# Patient Record
Sex: FEMALE | Race: BLACK OR AFRICAN AMERICAN | NOT HISPANIC OR LATINO | Employment: UNEMPLOYED | URBAN - METROPOLITAN AREA
[De-identification: names, ages, dates, MRNs, and addresses within clinical notes are randomized per-mention and may not be internally consistent; named-entity substitution may affect disease eponyms.]

---

## 2024-07-18 ENCOUNTER — TELEPHONE (OUTPATIENT)
Dept: MATERNAL FETAL MEDICINE | Facility: CLINIC | Age: 34
End: 2024-07-18
Payer: MEDICAID

## 2024-07-18 DIAGNOSIS — O09.292 HISTORY OF GESTATIONAL DIABETES IN PRIOR PREGNANCY, CURRENTLY PREGNANT IN SECOND TRIMESTER: ICD-10-CM

## 2024-07-18 DIAGNOSIS — O09.522 AMA (ADVANCED MATERNAL AGE) MULTIGRAVIDA 35+, SECOND TRIMESTER: Primary | ICD-10-CM

## 2024-07-18 DIAGNOSIS — Z86.32 HISTORY OF GESTATIONAL DIABETES IN PRIOR PREGNANCY, CURRENTLY PREGNANT IN SECOND TRIMESTER: ICD-10-CM

## 2024-07-18 DIAGNOSIS — O09.892 HISTORY OF PRETERM DELIVERY, CURRENTLY PREGNANT IN SECOND TRIMESTER: ICD-10-CM

## 2024-07-18 NOTE — TELEPHONE ENCOUNTER
Called pt twice today to schedule appt. A small child answers the phone and verbalizes she is at work. Unable to schedule appt with patient until she is reached.

## 2024-07-19 ENCOUNTER — TELEPHONE (OUTPATIENT)
Dept: MATERNAL FETAL MEDICINE | Facility: CLINIC | Age: 34
End: 2024-07-19
Payer: MEDICAID

## 2024-07-22 ENCOUNTER — TELEPHONE (OUTPATIENT)
Dept: MATERNAL FETAL MEDICINE | Facility: CLINIC | Age: 34
End: 2024-07-22
Payer: MEDICAID

## 2024-07-22 PROBLEM — O99.320 MARIJUANA USE DURING PREGNANCY: Status: ACTIVE | Noted: 2024-07-22

## 2024-07-22 PROBLEM — O09.299 HISTORY OF GESTATIONAL DIABETES MELLITUS (GDM) IN PRIOR PREGNANCY, CURRENTLY PREGNANT: Status: ACTIVE | Noted: 2024-07-22

## 2024-07-22 PROBLEM — F12.90 MARIJUANA USE DURING PREGNANCY: Status: ACTIVE | Noted: 2024-07-22

## 2024-07-22 PROBLEM — Z86.32 HISTORY OF GESTATIONAL DIABETES MELLITUS (GDM) IN PRIOR PREGNANCY, CURRENTLY PREGNANT: Status: ACTIVE | Noted: 2024-07-22

## 2024-07-22 PROBLEM — O09.292 HISTORY OF PRETERM PREMATURE RUPTURE OF MEMBRANES (PROM) IN PREVIOUS PREGNANCY, CURRENTLY PREGNANT IN SECOND TRIMESTER: Status: ACTIVE | Noted: 2024-07-22

## 2024-07-22 PROBLEM — O09.90 AT HIGH RISK FOR COMPLICATIONS OF INTRAUTERINE PREGNANCY (IUP): Status: ACTIVE | Noted: 2024-07-22

## 2024-07-22 PROBLEM — O09.522 MULTIGRAVIDA OF ADVANCED MATERNAL AGE IN SECOND TRIMESTER: Status: ACTIVE | Noted: 2024-07-22

## 2024-07-22 PROBLEM — O09.299 HX OF PREECLAMPSIA, PRIOR PREGNANCY, CURRENTLY PREGNANT: Status: ACTIVE | Noted: 2024-07-22

## 2024-07-22 PROBLEM — E66.3 OVERWEIGHT (BMI 25.0-29.9): Status: ACTIVE | Noted: 2024-07-22

## 2024-07-22 PROBLEM — Z14.8 CARRIER OF SPINAL MUSCULAR ATROPHY: Status: ACTIVE | Noted: 2024-07-22

## 2024-07-22 NOTE — PROGRESS NOTES
Maternal Fetal Medicine New Consult    Subjective:     Patient ID: [id number]    Chief Complaint: MFM Consult w/us  (For AMA, h/o PTD x2, h/o GHTN and GDM, + UDS/)      HPI: 34 y.o.  female  at 14w1d gestation with Estimated Date of Delivery:  2025. by LMP, consistent with early US. She is sent for MFM consultation for AMA, h/o PTD x's 2, h/o GHTN and GDM in prior pregnancies, pos UDS .     She is of advanced maternal age and will be 34 by the DELFINO.  She reports that she did cell free DNA 2 days ago.  She has history of 2 previous  deliveries.  Her 1st  delivery was at 35 weeks due to PPROM.  Her 2nd  delivery was at 32 weeks due to  labor.  That pregnancy was also complicated by gestational hypertension and GDM. She was on Dania for her last pregnancy and delivered at term. She has vague history of prediabetes vs type 2 Diabetes diagnosed in 2018 that she now reports only having gestational diabetes. She has CHTN, diagnosed in her last / pregnancy. She had carrier screening that returned positive for SMA carrier.  She reports that the father of the baby has the order to do the lab work to check for this.  She had UDS on 24 that was positive for cannabinoids. She denies any drug use.  Her BMI is  28.43 at initial MFM visit. She has known fibroid uterus. She is on low dose aspirin once daily.       She denies any personal or family history of aneuploidy or anomalies. She denies any exposure to high fevers, viral rashes, or alcohol in this pregnancy.  She denies any leaking fluid, vaginal bleeding, contractions, decreased fetal movement. Denies headaches, visual disturbances, or epigastric pain.       Pregnancy complications include:  Patient Active Problem List   Diagnosis    Multigravida of advanced maternal age in second trimester    (BMI 25.0-29.9)    At high risk for complications of intrauterine pregnancy (IUP)    Marijuana use during pregnancy    History of  " premature rupture of membranes (PROM) in previous pregnancy, currently pregnant in second trimester    Hx of preeclampsia, prior pregnancy, currently pregnant    History of gestational diabetes mellitus (GDM) in prior pregnancy, currently pregnant    Carrier of spinal muscular atrophy    Pre-existing essential hypertension complicating pregnancy in second trimester    Type 2 diabetes mellitus affecting pregnancy in second trimester, antepartum       Past Medical History:   Diagnosis Date    Gestational diabetes     ,     Gestational hypertension     2022       History reviewed. No pertinent surgical history.    Family History   Problem Relation Name Age of Onset    Hypertension Maternal Grandmother      Diabetes Maternal Grandmother      Heart attack Maternal Grandmother      Diabetes Mother         Social History     Socioeconomic History    Marital status: Single   Tobacco Use    Smoking status: Never     Passive exposure: Current    Smokeless tobacco: Never   Substance and Sexual Activity    Alcohol use: Not Currently    Drug use: Never    Sexual activity: Yes     Partners: Male       Current Outpatient Medications   Medication Sig Dispense Refill    aspirin (ECOTRIN) 81 MG EC tablet Take 81 mg by mouth.      prenatal vit37-iron-folic acid 29 mg iron- 1 mg Chew Take by mouth.      ondansetron (ZOFRAN-ODT) 4 MG TbDL Take 4 mg by mouth. (Patient not taking: Reported on 2024)       No current facility-administered medications for this visit.       Review of patient's allergies indicates:  No Known Allergies     Review of Systems   12 point review of systems conducted, negative except as stated in the history of present illness. See HPI for details.      Objective:     Visit Vitals  /71 (BP Location: Left arm, Patient Position: Sitting, BP Method: Medium (Automatic))   Pulse 87   Ht 5' 8" (1.727 m)   Wt 84.8 kg (187 lb)   LMP  (LMP Unknown)   BMI 28.43 kg/m²        Physical " Exam  Vitals and nursing note reviewed.   Constitutional:       General: She is not in acute distress.     Appearance: Normal appearance.   HENT:      Head: Normocephalic and atraumatic.      Nose: Nose normal. No congestion.      Mouth/Throat:      Pharynx: Oropharynx is clear.   Eyes:      General: No scleral icterus.     Conjunctiva/sclera: Conjunctivae normal.   Cardiovascular:      Rate and Rhythm: Normal rate and regular rhythm.   Pulmonary:      Effort: No respiratory distress.      Breath sounds: Normal breath sounds. No wheezing.   Abdominal:      General: Abdomen is flat.      Palpations: Abdomen is soft.      Tenderness: There is no abdominal tenderness. There is no right CVA tenderness, left CVA tenderness or guarding.      Comments: No CVA tenderness gravid uterus.    Musculoskeletal:         General: Normal range of motion.      Cervical back: Neck supple.      Right lower leg: No edema.      Left lower leg: No edema.   Skin:     General: Skin is warm.      Findings: No bruising or rash.   Neurological:      General: No focal deficit present.      Mental Status: She is oriented to person, place, and time.      Deep Tendon Reflexes: Reflexes normal.      Comments: Normal reflexes   Psychiatric:         Mood and Affect: Mood normal.         Behavior: Behavior normal.         Thought Content: Thought content normal.         Judgment: Judgment normal.         Assessment/Plan:     34 y.o.  female with IUP at 14w1d    Advanced maternal age at 34  Viable IUP with size c/w dates on 24    I discussed with her that the higher risk of aneuploidy related to advanced maternal age is caused by meiotic nondysjunction.  At this maternal  age, the risk of Down syndrome quoted at 1:267 and the risk of any chromosomal problems quoted at 1:148. She would not consider termination of pregnancy even if anomalies or aneuploidy is detected. She was advised of the screening nature of the Level 2 ultrasound as well  as the screening nature of a quad screen to check for the risk of aneuploidy with normal ultrasound and or quad screen testing that would lower the background risk of aneuploidy. We discussed the screening options available for risk assessment for fetal aneuploidy during pregnancy.  We also discussed the available diagnostic tests (CVS and amniocentesis).    The patient received genetic counseling about diagnostic and screening options, including: Cell free DNA, First trimester risk-assessment with nuchal translucency and serum markers (between 11 and 14 weeks) with second trimester lab draw between 15-20 weeks, ideally prior to anatomic survey, Chorionic villus sampling (CVS), Amniocentesis, and Second trimester quad screening or integrated screening. Unlike screening tests, diagnostic tests provide definitive answers regarding the presence of fetal chromosome abnormalities. In addition to assessing for the presence of the common fetal aneuploidies, diagnostic tests can assess for abnormalities in the number and structure of all chromosomes. While both procedures are generally quite safe, there is a risk of miscarriage associated with these procedures. For CVS, the estimated risk of miscarriage attributable to the procedure is 1 in 500. For genetic amniocentesis, the estimated risk of miscarriage attributable to the procedure is 1 in 900.      She was counseled on Cell free DNA understanding that it is an enhanced screening test but not a diagnostic test. It assesses risk for common aneuploidies such as Trisomy 13, 18, and 21 by evaluating cell-free fetal DNA in maternal circulation with a false positive rate less than 0.5% for Trisomy 21 and less reliable for Trisomy 13 and Trisomy 18. She had cell free DNA that is pending.      She was advised that the only diagnostic test after 16 weeks is genetic amniocentesis.  Benefits and risks of a genetic amniocentesis were discussed. Patient was offered genetic  amniocentesis, after thorough counseling on benefits and risks of procedure, with very remote risk of complications and in some studies no identifiable increased risk, above background risk of spontaneous miscarriage, while some current data suggests risk up to 1 in 800 with early amniocentesis prior to 24 weeks, and remote risk of need for emergency delivery with all the complications of prematurity with amniocentesis after 24 weeks.     I also discussed with them that cell free DNA will not detect other genetic diseases or more subtle chromosomal abnormalities like microdeletions or duplications. The added benefit of doing chromosomal microarray analysis on amniotic fluid is that it would detect clinically relevant deletions or duplications in about 1:60 (1.7%) when the indication is abnormal quad screen or advanced maternal age, and 6.0 % when a structural anomaly is present. The concern about microarray analysis is that it could give uncertain findings in about 1.5-2.0% of cases that would increase anxiety and may require specialized genetic counseling.     In a recent study from 2018, clinically significant chromosomal microarray aberrations were seen in 4.7% of pregnancies with US anomalies (excluding soft markers for aneuploidy), but including patients with isolated FGR and polyhydramnios. The most common abnormality seen with cardiovascular defects was 22q11.21 deletion (DiGeorge-velocardiofacial syndrome), and the most common abnormality among genitourinary malformations was 17q12 deletion (encompassing HFNF1B). Aberrations were present in 13.1% if multiple anomalies, and around 4 % if single ultrasonographic anomaly.     She declined amniocentesis . She is aware of the need for  evaluation.    The higher risk of anomalies associated with advanced maternal age was also addressed. The reassurance obtained by the normal ultrasound and the limitations of ultrasound in checking for anomalies was  explained with the need for regular  evaluations.     I recommend targeted anatomical ultrasound at 18-20 weeks, and additional fetal surveillance as below.    Delivery timing as below.    Previous  delivery x2  I discussed with her the increased risk of recurrence of another  delivery with risk around 1/3. The risk ranges from 15% with one previous  delivery after 32 weeks that was followed by a term at birth to 60% with history of 2 consecutive  deliveries before 32 weeks. I have shared with her that the  delivery could occur at an earlier gestational age with more significant morbidity and high risk of  mortality associated with that.    Discussed withdrawal of 17P by FDA and new guidelines for management of previous  delivery. Recommend intermittent transvaginal ultrasounds starting at 16 weeks until 23 weeks. If cervical length is less than 3 cm, weekly TVUS is recommended and may consider vaginal progesterone nightly. If cervix is less than 25 mm, consider cerclage, especially if previous  delivery less 28 weeks, than along with continuing vaginal progesterone nightly. Conversely, may consider nightly vaginal progesterone nightly starting at 16 weeks until 37 weeks regardless of cervical length, although no data of benefit if cervix longer than 3 cm. Discussed risks/benefits of all options, understanding that neither option is fully protective against pregnancy loss. She would like to start vaginal progesterone nightly in addition to cervical surveillance.    TVUS today 2024  with normal closed cervix 3.0 cm without funneling at the IO. No evidence of cervical insufficiency at this time. Will recheck in about 2 weeks. PTL precautions given.    Will plan to do TVUS intermittently until 24 weeks. If cervical length less than 2.5 cm, consider cerclage along with vaginal progesterone. PTL precautions given.       Possible prediabetes vs T2DM  vs Hx GDM  Discussed increased risk of recurrence of GDM in subsequent pregnancies  With this vague history of prediabetes vs T2DM vs GDM, and weight loss since last pregnancy, we will plan to get HgbA1c to further evaluate. If elevated, recommend to start home accuchecks and manage accordingly. If normal, recommend early 1 hour GCT with a fasting glucose level at the usual time (around 24-28 weeks).   Advised patient of the importance of following a healthy diet and avoiding excessive weight gain during pregnancy.    Chronic hypertension  Chronic hypertension complicates up to 2-5% of pregnancies and is associated with significant adverse pregnancy outcomes including  birth, fetal growth restriction, fetal demise, placental abruption, and  delivery. Recent data suggest higher risk of certain congenital anomalies, including, heart defects, hypospadias and esophageal atresia. The incidence of adverse outcomes seen in pregnancies complicated by chronic hypertension is related to the degree and duration of hypertension and to the association of other organ system involvement or damage. Most pregnant women with mild chronic hypertension have uneventful pregnancies with no end-organ involvement. Comparing women who developed superimposed preeclampsia with women who did not, the incidence of  birth was 100% versus 38%, the incidence of small-for-gestational-age (SGA) infants was 78% versus 15%, and the  mortality rate was 48% versus 0%. Besides superimposed preeclampsia or eclampsia, pregnancy complicated by chronic hypertension (especially if severe) may be associated with worsening or malignant hypertension, central nervous system hemorrhage, cardiac decompensation, and renal deterioration or failure.    The age of onset, results of previous evaluation, severity and duration of hypertension, and physical examination are important determinants of which clinical tests may be useful. Ideally,  "a woman with chronic hypertension should be evaluated before pregnancy to ascertain potentially reversible causes and end-organ involvement (eg, heart or kidney). Baseline laboratory tests may include serum creatinine, blood urea nitrogen, electrolytes, CBC and 24-hour urine evaluation for total protein and creatinine clearance. Women who have had hypertension for several years are more likely to have cardiomegaly, ischemic heart disease, renal involvement, and retinopathy. In patients with severe disease over 4 years, or long standing hypertension (typically if over 30 years old), tests which may prove useful in the evaluation of these women include electrocardiography, echocardiography, ophthalmologic examination, and renal ultrasonography.     Women with paroxysmal hypertension, frequent "hypertensive crisis," seizure disorders, or anxiety attacks should be evaluated for pheochromocytoma with measurements of 24-hour urine vanillylmandelic acid, metanephrines, or unconjugated catecholamines. Magnetic resonance imaging after the first trimester or computed tomography may be useful for adrenal tumor localization. Renal artery stenosis appears to be more prevalent in patients with type-2 diabetes and coexistent hypertension. Doppler flow studies or magnetic resonance angiography are helpful to detect renal artery stenosis. Negative results from renal ultrasonography do not exclude renal artery stenosis.     In women with chronic hypertension, it can be difficult to discern worsening hypertension that might occur as a result of physiological changes of pregnancy in the third trimester from the development of preeclampsia. The use of certain laboratory tests such as serum creatinine, liver functions tests, hematocrit and platelets to compare to baseline values from early in pregnancy might serve to delineate these conditions. Routine screening of women with chronic hypertension with these laboratory tests is not " routinely indicated.    I have shared with her the normal natural course of chronic hypertension in pregnancy with improvement early on and likely increasing blood pressure as the pregnancy advances. Based on findings of recent CHAP Study, it is recommended to utilize 140/90 as the threshold for initiation or titration of medical therapy for chronic hypertension in pregnancy, rather than the previously recommended threshold of 160/110. For patients on blood pressure medications at the start of pregnancy, in the absence of mitigating factors or side effects, they can be maintained on their medications, rather than discontinuing them and waiting to initiate treatment for blood pressures in the severe range. Commonly used medications include nifedipine and labetalol.    BP Readings from Last 1 Encounters:   24 115/71   With this blood pressure, she was advised to follow low sodium diet with no medication at this time.    Use aspirin as discussed.    She was advised of the higher risk of fetal growth restriction and superimposed preeclampsia with her underlying chronic hypertension. The risk of placental abruption was also discussed. No prevention is available with her reporting any bleeding or cramping. She was also advised to report any headaches, visual problems, epigastric pain.    There is no consensus as to the most appropriate fetal surveillance test(s) or the interval and timing of testing in  with chronic hypertension. Thus, such testing should be individualized and based on clinical judgment and on severity of disease.    We will plan to do a level 2 scan around 20 weeks understanding the limitations of ultrasound in checking anomalies and follow-up ultrasounds to monitor growth around 24-26 weeks and then every 4 weeks until the end of pregnancy. Recommend fetal testing starting around 32 weeks gestation until the end of pregnancy    Among patients with uncomplicated chronic hypertension with no  additional risk factors, delivery at 38-39 weeks appears to provide optimal balance between the risk of adverse fetal and  outcomes. However, with multiple comorbidities, will reassess closer to EDC to determine optimal timeframe or GA for delivery, based on current evaluation at that time.      UDS positive for marijuana  I discussed with her the likely cognitive developmental effect of those drugs that would not be visible by the ultrasound and the importance of long term  follow up.  Marijuana use in pregnancy is associated with long term neurobehavioral adverse outcomes, as well as fetal growth restriction, stillbirth, spontaneous  birth, and  intensive care unit admission. .  I stressed the importance of complete abstinence from drug use in pregnancy. I would recommend doing intermittent drug screens to help with compliance, with risks and benefits of drug testing discussed and with ultrasounds to check fetal growth every 4 weeks (with continued drug use).         SMA carrier  Spinal muscular atrophy (SMA) is characterized by degeneration of the anterior horn cells in the spinal cord and motor nuclei in the lower brainstem, which results in progressive muscle weakness and atrophy. The incidence of spinal muscular atrophy ranges from 5 to 13 per 100,000 live births, and the carrier frequency of disease-causing SMN1 mutations ranges from 1:100 to 1:45, with marked interethnic variability.      Patients with all forms of SMA have diffuse symmetric proximal muscle weakness that is greater in the lower than upper limbs and absent or markedly decreased deep tendon reflexes. Infants with SMA type 1 have a severe symmetric flaccid paralysis and are unable to sit unsupported. All SMA types, particularly SMA type 1, may be associated with restrictive lung disease. Infants with type 1 SMA usually  before the 2nd birthday and rarely live beyond 5 years. Children with type 2 or 3 SMA live  full lives depending on the severity of symptoms.    The inheritance pattern of the common forms of SMA is autosomal recessive.  I discussed with her that if the father of the baby is affected, there is 25% risk of transmission to the baby. I discussed with her that if the FOB is negative, then that is reassuring but a very small risk remains for transmission to fetus, not zero, as the test does not  all mutations.    She was advised that the only diagnostic test at this time is genetic amniocentesis.  Benefits and risks of a genetic amniocentesis were discussed. Patient was offered genetic amniocentesis, after thorough counseling on benefits and risks of procedure, with very remote risk of complications and in some studies no identifiable increased risk, above background risk of spontaneous miscarriage, while some current data suggests risk up to 1 in 800 with early amniocentesis prior to 24 weeks, and remote risk of need for emergency delivery with all the complications of prematurity with amniocentesis after 24 weeks. She declined amniocentesis .     I discussed with her that the approximate frequency of carrier status in  [place] is 1/72.  Thus, if partner is , the approximate risk of the baby being affected with SMA without additional testing is 1/288..  She verbalized understanding and will have FOB tested, and he currently already has that order. She was made aware of the need for  evaluation.       BMI 28.4 at initial OB visit  Body mass index is 28.43 kg/m².    She was advised of the importance of eating healthy and and limiting weight gain to 15-25lbs during the pregnancy, as optimal in this situation. Discussed the importance of losing weight after this pregnancy, especially before attempting future pregnancy. Breastfeeding may be an important tool in reducing the postpartum weight retention. Excess weight gain would be associated with worsening hypertension,  diabetes and adverse  outcomes, including fetal demise in utero.       At risk for preeclampsia  With her risk factors for preeclampsia including preeclampsia/GHTN in a previous pregnancy and chronic hypertension and possible preexisting diabetes,  ancestry, low socioeconomic status, and maternal age 35 years or older, discussed recommendations for low dose aspirin use to decrease risks for adverse outcomes, including preeclampsia, low birth rate and  delivery. Low-dose aspirin reduced the risk for preeclampsia by 15% in clinical trials and reduced the risk for  birth by 20% and FGR by 20%, and  mortality by around 20%.  After discussing risks/benefits of its use, it was agreed to start asa 81 mg BID, due to multiple risk factors for preeclampsia. After discussing benefits (more effective than daily) vs potential risks (less data on safety with use at delivery), she agreed to start low dose aspirin twice daily and continue until 34 6/7weeks, then decrease to once daily until delivery. Also, recommend using in all future pregnancies.    Placenta previa  I discussed with her that with this finding, that 1 in 200 pregnancies are complicated by previa. I discussed with her that more than 90% of placentas in this situation will move upward as the pregnancy advances and would not pose problem during the pregnancy.  However, she was advised to be on pelvic rest till we see upward migration of the placenta and expectant management needs to be done.      I discussed with her if she has any intermittent bleeding to report that immediately and go hospital/office to be evaluated.  If placenta covering cervix persists, she will need a  section with higher risk of postpartum hemorrhage and the risk of hysterectomy at the time of the section.  However, this is unlikely with no previous uterine surgery at this stage in the pregnancy. Otherwise, at this time, expectant management needs  to be done.      Leiomyoma  Largest mean 1.9 cm on 24    I counseled the patient regarding fibroids in pregnancy. She was counseled on the risk for  delivery,  PROM, abnormal presentations, higher risk of  sections, placenta previa, fibroid degeneration, poor fetal growth, oligohydramnios, vaginal bleeding and postpartum hemorrhage and in rare circumstances obstruction of lower uterus/cervix, impeding vaginal delivery. Although it was previously suggested that myomas are associated with spontaneous miscarriages, a recent review from 2017, showed no increased risk of spontaneous miscarriages in an analysis of 20,000 pregnant women with fibroids.Recent data shows potential association with increase stillbirth risk. The size of myomas, number and position behind placenta may be important factors in that risk. There is no prevention available and expectant management needs to be done.     Myomectomy is generally avoided in pregnancy due to concerns regarding pregnancy complications.    Recommend fetal growth ultrasound at 32 weeks (if no other risk factors) for patients with a single large fibroid or multiple fibroids. If concern for degenerating fibroids, can administer short course of NSAID (48-72hrs of Indocin) prior to 32 weeks gestation.     She was instructed to report any increased pain, cramps, vaginal discharge or bleeding. Emphasized the importance of monitoring fetal kick count activity, and reporting immediately if decreased fetal movement occurs.    Document postpartum hemorrhage risk on admission to hospital, ensure T&S sent, and consider type and crossmatch depending on postpartum hemorrhage risk    History of past history of diabetes but not recently.  Her hemoglobin A1c was ordered.  Random sugar in the office is normal.  She weighs less than now than it was before and likely improvement in the sugar.  Multiple fibroids seen.  Patient quit using marijuana.  She will stay on a  low-salt diet.  We will order 24 hour urine.  Reviewed records from Dr. Bruce.  Partner to be tested for SMA carrier status.  Diet advice with proper weight gain recommendations reviewed.  We will plan to do cervical surveillance.  With cervical length at 3 cm started vaginal progesterone and will rechecked again in 2 weeks.  Anterior complete placenta previa.  Pelvic rest and expect movement of the placenta upward as the pregnancy advances. Patient's questions were answered.  She is in agreement with plan of care.        Follow up in about 2 weeks (around 8/6/2024) for MFM follow-up, Transvaginal ultrasound.     No future appointments.       Patient was evaluated by Nereida Mcgee, BRIAN, and and Dr. Wilson.  Final assessment and recommendations as stated above were made by Dr. Wilson.    This note was created with the assistance of [employer] voice recognition software. There may be transcription errors as a result of using this technology, however minimal. Effort has been made to ensure accuracy of transcription, but any obvious errors or omissions should be clarified with the author of the document.

## 2024-07-22 NOTE — TELEPHONE ENCOUNTER
Attempted to contact pt 3x's to confirm her scheduled consultation. Had to [facility] for pt.

## 2024-07-22 NOTE — TELEPHONE ENCOUNTER
----- Message from Kristal Buck sent at 7/22/2024  1:14 PM CDT -----  Regarding: FW: cfDNA    ----- Message -----  From: Shazia Norman PA-C  Sent: 7/22/2024  12:39 PM CDT  To: [facility]   Subject: cfDNA                                            Please call Dr. Burden office and see if they did a cfDNA    DR. BURDEN'S OFFICE SAID NO THEY DO NOT HAVE ONE ON FILE.

## 2024-07-23 ENCOUNTER — OFFICE VISIT (OUTPATIENT)
Dept: MATERNAL FETAL MEDICINE | Facility: CLINIC | Age: 34
End: 2024-07-23
Payer: MEDICAID

## 2024-07-23 ENCOUNTER — PROCEDURE VISIT (OUTPATIENT)
Dept: MATERNAL FETAL MEDICINE | Facility: CLINIC | Age: 34
End: 2024-07-23
Payer: MEDICAID

## 2024-07-23 VITALS
BODY MASS INDEX: 28.34 KG/M2 | DIASTOLIC BLOOD PRESSURE: 71 MMHG | HEART RATE: 87 BPM | HEIGHT: 68 IN | SYSTOLIC BLOOD PRESSURE: 115 MMHG | WEIGHT: 187 LBS

## 2024-07-23 DIAGNOSIS — O34.10 LEIOMYOMA IN PREGNANCY, UTERINE, ANTEPARTUM: ICD-10-CM

## 2024-07-23 DIAGNOSIS — O99.320 MARIJUANA USE DURING PREGNANCY: ICD-10-CM

## 2024-07-23 DIAGNOSIS — E66.3 OVERWEIGHT (BMI 25.0-29.9): ICD-10-CM

## 2024-07-23 DIAGNOSIS — O44.02 PLACENTA PREVIA ANTEPARTUM IN SECOND TRIMESTER: ICD-10-CM

## 2024-07-23 DIAGNOSIS — O09.522 AMA (ADVANCED MATERNAL AGE) MULTIGRAVIDA 35+, SECOND TRIMESTER: ICD-10-CM

## 2024-07-23 DIAGNOSIS — O09.292 HISTORY OF PRETERM PREMATURE RUPTURE OF MEMBRANES (PROM) IN PREVIOUS PREGNANCY, CURRENTLY PREGNANT IN SECOND TRIMESTER: ICD-10-CM

## 2024-07-23 DIAGNOSIS — O09.892 HISTORY OF PRETERM DELIVERY, CURRENTLY PREGNANT IN SECOND TRIMESTER: ICD-10-CM

## 2024-07-23 DIAGNOSIS — Z86.32 HISTORY OF GESTATIONAL DIABETES MELLITUS (GDM) IN PRIOR PREGNANCY, CURRENTLY PREGNANT: ICD-10-CM

## 2024-07-23 DIAGNOSIS — F12.90 MARIJUANA USE DURING PREGNANCY: ICD-10-CM

## 2024-07-23 DIAGNOSIS — O09.299 HISTORY OF GESTATIONAL DIABETES MELLITUS (GDM) IN PRIOR PREGNANCY, CURRENTLY PREGNANT: ICD-10-CM

## 2024-07-23 DIAGNOSIS — Z86.32 HISTORY OF GESTATIONAL DIABETES IN PRIOR PREGNANCY, CURRENTLY PREGNANT IN SECOND TRIMESTER: ICD-10-CM

## 2024-07-23 DIAGNOSIS — Z14.8 CARRIER OF SPINAL MUSCULAR ATROPHY: ICD-10-CM

## 2024-07-23 DIAGNOSIS — D25.9 LEIOMYOMA IN PREGNANCY, UTERINE, ANTEPARTUM: ICD-10-CM

## 2024-07-23 DIAGNOSIS — O09.292 HISTORY OF GESTATIONAL DIABETES IN PRIOR PREGNANCY, CURRENTLY PREGNANT IN SECOND TRIMESTER: ICD-10-CM

## 2024-07-23 DIAGNOSIS — O10.012 PRE-EXISTING ESSENTIAL HYPERTENSION COMPLICATING PREGNANCY IN SECOND TRIMESTER: ICD-10-CM

## 2024-07-23 DIAGNOSIS — O09.522 MULTIGRAVIDA OF ADVANCED MATERNAL AGE IN SECOND TRIMESTER: Primary | ICD-10-CM

## 2024-07-23 DIAGNOSIS — O09.90 AT HIGH RISK FOR COMPLICATIONS OF INTRAUTERINE PREGNANCY (IUP): ICD-10-CM

## 2024-07-23 DIAGNOSIS — O24.112 TYPE 2 DIABETES MELLITUS AFFECTING PREGNANCY IN SECOND TRIMESTER, ANTEPARTUM: ICD-10-CM

## 2024-07-23 DIAGNOSIS — O09.299 HX OF PREECLAMPSIA, PRIOR PREGNANCY, CURRENTLY PREGNANT: ICD-10-CM

## 2024-07-23 PROBLEM — O10.011 PRE-EXISTING ESSENTIAL HYPERTENSION COMPLICATING PREGNANCY IN FIRST TRIMESTER: Status: ACTIVE | Noted: 2024-07-23

## 2024-07-23 PROBLEM — O10.011 PRE-EXISTING ESSENTIAL HYPERTENSION COMPLICATING PREGNANCY IN FIRST TRIMESTER: Chronic | Status: ACTIVE | Noted: 2024-07-23

## 2024-07-23 LAB — GLUCOSE SERPL-MCNC: 124 MG/DL (ref 70–110)

## 2024-07-23 RX ORDER — PROGESTERONE 200 MG/1
200 CAPSULE ORAL NIGHTLY
Qty: 30 CAPSULE | Refills: 5 | Status: SHIPPED | OUTPATIENT
Start: 2024-07-23 | End: 2025-01-19

## 2024-07-23 RX ORDER — ASPIRIN 81 MG/1
81 TABLET ORAL
COMMUNITY
Start: 2024-07-17 | End: 2024-07-23 | Stop reason: SDUPTHER

## 2024-07-23 RX ORDER — ONDANSETRON 4 MG/1
4 TABLET, ORALLY DISINTEGRATING ORAL
COMMUNITY
Start: 2024-07-21 | End: 2024-07-28

## 2024-07-23 RX ORDER — ASPIRIN 81 MG/1
81 TABLET ORAL 2 TIMES DAILY
Qty: 60 TABLET | Refills: 5 | Status: SHIPPED | OUTPATIENT
Start: 2024-07-23 | End: 2025-01-19

## 2024-08-01 DIAGNOSIS — O09.892 HISTORY OF PRETERM DELIVERY, CURRENTLY PREGNANT IN SECOND TRIMESTER: Primary | ICD-10-CM

## 2024-08-05 PROBLEM — O99.810 PREDIABETES IN MOTHER DURING PREGNANCY: Status: ACTIVE | Noted: 2024-08-05

## 2024-08-06 ENCOUNTER — OFFICE VISIT (OUTPATIENT)
Dept: MATERNAL FETAL MEDICINE | Facility: CLINIC | Age: 34
End: 2024-08-06
Payer: MEDICAID

## 2024-08-06 ENCOUNTER — PROCEDURE VISIT (OUTPATIENT)
Dept: MATERNAL FETAL MEDICINE | Facility: CLINIC | Age: 34
End: 2024-08-06
Payer: MEDICAID

## 2024-08-06 VITALS
WEIGHT: 190.38 LBS | SYSTOLIC BLOOD PRESSURE: 124 MMHG | BODY MASS INDEX: 28.85 KG/M2 | HEIGHT: 68 IN | DIASTOLIC BLOOD PRESSURE: 74 MMHG | HEART RATE: 90 BPM

## 2024-08-06 DIAGNOSIS — Z86.32 HISTORY OF GESTATIONAL DIABETES MELLITUS (GDM) IN PRIOR PREGNANCY, CURRENTLY PREGNANT: ICD-10-CM

## 2024-08-06 DIAGNOSIS — O09.892 HISTORY OF PRETERM DELIVERY, CURRENTLY PREGNANT IN SECOND TRIMESTER: ICD-10-CM

## 2024-08-06 DIAGNOSIS — E66.3 OVERWEIGHT (BMI 25.0-29.9): ICD-10-CM

## 2024-08-06 DIAGNOSIS — Z14.8 CARRIER OF SPINAL MUSCULAR ATROPHY: ICD-10-CM

## 2024-08-06 DIAGNOSIS — O10.012 PRE-EXISTING ESSENTIAL HYPERTENSION COMPLICATING PREGNANCY IN SECOND TRIMESTER: Primary | ICD-10-CM

## 2024-08-06 DIAGNOSIS — O99.810 PREDIABETES IN MOTHER DURING PREGNANCY: ICD-10-CM

## 2024-08-06 DIAGNOSIS — O09.522 MULTIGRAVIDA OF ADVANCED MATERNAL AGE IN SECOND TRIMESTER: ICD-10-CM

## 2024-08-06 DIAGNOSIS — D25.9 LEIOMYOMA IN PREGNANCY, UTERINE, ANTEPARTUM: ICD-10-CM

## 2024-08-06 DIAGNOSIS — O09.299 HISTORY OF GESTATIONAL DIABETES MELLITUS (GDM) IN PRIOR PREGNANCY, CURRENTLY PREGNANT: ICD-10-CM

## 2024-08-06 DIAGNOSIS — O24.414 INSULIN CONTROLLED GESTATIONAL DIABETES MELLITUS (GDM) IN SECOND TRIMESTER: Primary | ICD-10-CM

## 2024-08-06 DIAGNOSIS — O34.10 LEIOMYOMA IN PREGNANCY, UTERINE, ANTEPARTUM: ICD-10-CM

## 2024-08-06 DIAGNOSIS — O44.02 PLACENTA PREVIA ANTEPARTUM IN SECOND TRIMESTER: ICD-10-CM

## 2024-08-06 DIAGNOSIS — O09.292 HISTORY OF PRETERM PREMATURE RUPTURE OF MEMBRANES (PROM) IN PREVIOUS PREGNANCY, CURRENTLY PREGNANT IN SECOND TRIMESTER: ICD-10-CM

## 2024-08-06 DIAGNOSIS — O09.299 HX OF PREECLAMPSIA, PRIOR PREGNANCY, CURRENTLY PREGNANT: ICD-10-CM

## 2024-08-06 LAB — GLUCOSE SERPL-MCNC: 116 MG/DL (ref 70–110)

## 2024-08-06 PROCEDURE — 1160F RVW MEDS BY RX/DR IN RCRD: CPT | Mod: CPTII,,, | Performed by: OBSTETRICS & GYNECOLOGY

## 2024-08-06 PROCEDURE — 3008F BODY MASS INDEX DOCD: CPT | Mod: CPTII,,, | Performed by: OBSTETRICS & GYNECOLOGY

## 2024-08-06 PROCEDURE — 3074F SYST BP LT 130 MM HG: CPT | Mod: CPTII,,, | Performed by: OBSTETRICS & GYNECOLOGY

## 2024-08-06 PROCEDURE — 3078F DIAST BP <80 MM HG: CPT | Mod: CPTII,,, | Performed by: OBSTETRICS & GYNECOLOGY

## 2024-08-06 PROCEDURE — 82962 GLUCOSE BLOOD TEST: CPT | Mod: ,,, | Performed by: OBSTETRICS & GYNECOLOGY

## 2024-08-06 PROCEDURE — 99214 OFFICE O/P EST MOD 30 MIN: CPT | Mod: TH,,, | Performed by: OBSTETRICS & GYNECOLOGY

## 2024-08-06 PROCEDURE — 1159F MED LIST DOCD IN RCRD: CPT | Mod: CPTII,,, | Performed by: OBSTETRICS & GYNECOLOGY

## 2024-08-06 RX ORDER — SYRINGE,SAFETY WITH NEEDLE,1ML 25GX1"
SYRINGE (EA) MISCELLANEOUS
Qty: 30 EACH | Refills: 3 | Status: SHIPPED | OUTPATIENT
Start: 2024-08-06

## 2024-08-06 RX ORDER — INSULIN HUMAN 100 [IU]/ML
INJECTION, SUSPENSION SUBCUTANEOUS
Qty: 10 ML | Refills: 3 | Status: SHIPPED | OUTPATIENT
Start: 2024-08-06 | End: 2025-08-06

## 2024-08-19 DIAGNOSIS — O09.292 HISTORY OF PRETERM PREMATURE RUPTURE OF MEMBRANES (PROM) IN PREVIOUS PREGNANCY, CURRENTLY PREGNANT IN SECOND TRIMESTER: ICD-10-CM

## 2024-08-19 DIAGNOSIS — O10.012 PRE-EXISTING ESSENTIAL HYPERTENSION COMPLICATING PREGNANCY IN SECOND TRIMESTER: Primary | ICD-10-CM

## 2024-08-19 PROBLEM — O44.02 PLACENTA PREVIA ANTEPARTUM IN SECOND TRIMESTER: Status: RESOLVED | Noted: 2024-07-23 | Resolved: 2024-08-19

## 2024-08-19 NOTE — PROGRESS NOTES
Maternal Fetal Medicine Follow Up    Subjective:     Patient ID: [id number]    Chief Complaint: M follow up with US (GDM.)      HPI: Cornelio Green is a 34 y.o. female  at 18w1d gestation with Estimated Date of Delivery: 25  who is here for follow-up consultation by M.    She is of advanced maternal age and will be 34 by the DELFINO.  She had negative cfDNA and declined amniocentesis.  She has history of 2 previous  deliveries.  Her 1st  delivery was at 35 weeks due to PPROM.  Her 2nd  delivery was at 32 weeks due to  labor. She was on [place] for her last pregnancy and delivered at term.  Her last pregnancy was also complicated by gestational hypertension and GDM. She has vague history of prediabetes vs type 2 Diabetes diagnosed in 2018 that she now reports only having gestational diabetes. On 24, she had hemoglobin A1c 6.3%, fasting glucose 128, consistent with prediabetes.  She is currently on insulin, 14 units of NPH at bedtime.  She has CHTN, diagnosed in her last / pregnancy. She is currently on no antihypertensives. She had carrier screening that returned positive for SMA carrier.  She reports that the father of the baby has the order to do the lab work to check for this but he has not done it yet. Her BMI was 28.43 at initial M visit. She has known fibroid uterus. She is on low dose aspirin twice daily.  She was noted to have placenta previa on consult ultrasound that was resolved on most recent ultrasound with no evidence of vasa previa.       Interval history since last M visit: None.. She denies any leaking fluid, vaginal bleeding, contractions, decreased fetal movement. Denies headaches, visual disturbances, or epigastric pain.    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    Medications:  Current Outpatient Medications   Medication Instructions    aspirin (ECOTRIN) 81 mg, Oral, 2 times daily, Take twice a day until 34 weeks 6 days, then decrease to once daily until delivery    insulin NPH (HUMULIN N NPH U-100 INSULIN) 100 unit/mL injection INJECT 14 UNITS AT BEDTIME    insulin syringe-needle U-100 1 mL 31 gauge x 5/16 Syrg Use 1 syringe as directed to inject insulin three times daily    prenatal vit37-iron-folic acid 29 mg iron- 1 mg Chew Oral    progesterone (PROMETRIUM) 200 mg, Vaginal, Nightly       Review of Systems   12 point review of systems conducted, negative except as stated in the history of present illness. See HPI for details.      Objective:     Visit Vitals  /82 (BP Location: Left arm, Patient Position: Sitting, BP Method: Medium (Automatic))   Pulse 87   Ht 5' 8" (1.727 m)   Wt 88.5 kg (195 lb)   LMP  (LMP Unknown)   BMI 29.65 kg/m²        Physical Exam  Vitals and nursing note reviewed.   Constitutional:       Appearance: Normal appearance.      Comments: Increased BMI   HENT:      Head: Normocephalic and atraumatic.      Nose: Nose normal. No congestion.   Cardiovascular:      Rate and Rhythm: Normal rate.   Pulmonary:      Effort: Pulmonary effort is normal.   Skin:     Findings: No rash.   Neurological:      Mental Status: She is alert and oriented to person, place, and time. "   Psychiatric:         Mood and Affect: Mood normal.         Behavior: Behavior normal.         Thought Content: Thought content normal.         Judgment: Judgment normal.         Assessment/Plan:     34 y.o.  female with IUP at 18w1d    Advanced maternal age at 34  FHT present per US today (2024).     Reviewed risks with AMA, including risks for PTL, FGR, anomalies not seen, aneuploidy and stillbirth at term. She previously declined amniocentesis . She is aware of need for  evaluation. She previously had cell free DNA that was negative .    I recommend targeted anatomical ultrasound at 20 weeks, and additional fetal surveillance as below.    Delivery timing as below.      Previous  delivery x2  Previously discussed withdrawal of 17P by FDA and new guidelines for management of previous  delivery. She is currently in cervical length surveillance. Will continue to do TVUS intermittently until 24 weeks. If cervical length less than 3 cm, consider vaginal progesterone. If less than 2.5cm, consider cerclage along with continuing vaginal progesterone.     TVUS today 2024  with normal closed cervix 3.5 cm without funneling at the IO. No evidence of cervical insufficiency at this time. Will recheck in about 2 weeks. PTL precautions given.       Prediabetes vs T2DM   Risks associated with diabetes in pregnancy include higher risk for polyhydramnios, fetal macrosomia and  metabolic complications (hypoglycemia, hyperbilirubinemia, hypocalcemia, erythema).     Continue 2200 calorie ADA diet.     Log reviewed.  There are scattered blood sugar elevations up to 157 postprandials but not in a trend way related to diet.  Patient has not been checking her fasting blood sugars unfortunately  .  Advised her that we need fasting glucose values as those are what we are mainly treating. Patient advised to continue taking 14 units of NPH at bedtime.  Stressed importance of strict diet  control.  I also added a corrective dose of Humalog of 1 unit for every 8 mg of sugar over 140..    She was advised to check glucose 4 times a day and send log/call with values weekly, and bring log to each visit. The need for strict blood sugar control with goals of treatment to keep pre-prandial blood sugars between 65 and 90 and 1 hr postprandial blood sugars less than 140 was reviewed.     Low dose aspirin as discussed.    Fetal testing could be started in this setting around 30-32 weeks gestation. She was advised to do fetal kick counts 3 times daily and PRN after 24 weeks, with immediate reporting of decreased fetal movements (<10 movements/hr).       Chronic hypertension  Chronic hypertension is associated with significant adverse pregnancy outcomes including  birth, fetal growth restriction, fetal demise, placental abruption, and  delivery. Based on findings of recent CHAP Study, it is recommended to utilize 140/90 as the threshold for initiation or titration of medical therapy for chronic hypertension in pregnancy, rather than the previously recommended threshold of 160/110. For patients on blood pressure medications at the start of pregnancy, in the absence of mitigating factors or side effects, they can be maintained on their medications, rather than discontinuing them and waiting to initiate treatment for blood pressures in the severe range.    BP Readings from Last 1 Encounters:   24 130/82     With this BP, she was advised to continue low salt diet, and hold off on antihypertensive medication at this time.     Low dose aspirin as discussed.    She would benefit from a level 2 scan around 20 weeks understanding the limitations of ultrasound in checking anomalies and follow-up ultrasounds to monitor growth around 24-26 weeks and then every 4 weeks until the end of pregnancy. Recommend fetal testing starting around 32 weeks gestation until the end of pregnancy.    Among patients with  uncomplicated chronic hypertension with no additional risk factors, delivery at 38-39 weeks appears to provide optimal balance between the risk of adverse fetal and  outcomes. If no medication and normal fetal assessment, recommend delivery at 39 weeks. However, will reassess closer to EDC to determine optimal timeframe or GA for delivery, based on current evaluation at that time.          SMA carrier  Previously discussed with her the inheritance pattern of SMA.  If the father of the baby is affected, there is 25% risk of transmission to the baby. If the FOB is negative, then that is reassuring but a very small risk remains for transmission to fetus, not zero, as the test does not  all mutations.  She we will be having testing for father of the baby and declined amniocentesis.  She is aware of the need for  evaluation.    The approximate frequency of carrier status in  [place] is 1/72.  Thus, if partner is , the approximate risk of the baby being affected with SMA without additional testing is 1/288..  She verbalized understanding and declined any additional testing. She was made aware of the need for  evaluation.     Patient stated that her partner had the test and he might or might not do it yet she is not sure.      BMI 28.4 at initial OB visit  She was advised of the importance of eating healthy and and limiting weight gain to 15-25lbs during the pregnancy, as optimal in this situation. Discussed the importance of losing weight after this pregnancy, especially before attempting future pregnancy. Breastfeeding may be an important tool in reducing the postpartum weight retention. Excess weight gain would be associated with worsening hypertension, diabetes and adverse  outcomes, including fetal demise in utero.       At risk for preeclampsia  With her increased risk for preeclampsia, she agreed to continue asa 81 mg BID until 34 6/7 weeks, then  decrease to once daily until delivery. Preeclampsia precautions reviewed.       Leiomyoma in pregnancy  Largest mean 1.9 cm on 24    Risks of fibroids include  delivery,  PROM, abnormal presentations, higher risk of  sections, placenta previa, fibroid degeneration, poor fetal growth, oligohydramnios, vaginal bleeding and postpartum hemorrhage and in rare circumstances obstruction of lower uterus/cervix, impeding vaginal delivery    Recommend fetal growth ultrasound at 32 weeks (if no other risk factors) for patients with a single large fibroid or multiple fibroids. If concern for degenerating fibroids, can administer short course of NSAID (48-72hrs of Indocin) prior to 32 weeks gestation.     She was instructed to report any increased pain, cramps, vaginal discharge or bleeding. Emphasized the importance of monitoring fetal kick count activity, and reporting immediately if decreased fetal movement occurs.    Advised patient to follow the diet strictly.  No adjustment could be done on the NPH dose at bedtime because there are no fasting blood sugars.  Added corrective dose of Humalog as mentioned above.  Patient will continue taking her aspirin twice a day.  A prescription for insulin syringes given.  Her partner may still do the SMA and carrier test but is not sure.  She is not interested in an amniocentesis.  MSAFP could be done with Dr. Bruce if not done yet.    Follow up in about 2 weeks (around 9/3/2024) for M follow-up, Level 2 scan, Transvaginal ultrasound.     Future Appointments   Date Time Provider Department Center   9/3/2024 10:30 AM Bradford Wilson MD [facility] [place]   9/3/2024 10:30 AM ROOM 2, [facility] [place]        SARAH involvement: Patient was evaluated and examined by Dr. Wilson. ALEJANDRO Butler, helped in pre charting of part of note.    This note was created with the assistance of [employer] voice recognition software. There may be transcription errors  as a result of using this technology, however minimal. Effort has been made to ensure accuracy of transcription, but any obvious errors or omissions should be clarified with the author of the document.

## 2024-08-20 ENCOUNTER — OFFICE VISIT (OUTPATIENT)
Dept: MATERNAL FETAL MEDICINE | Facility: CLINIC | Age: 34
End: 2024-08-20
Payer: MEDICAID

## 2024-08-20 ENCOUNTER — PROCEDURE VISIT (OUTPATIENT)
Dept: MATERNAL FETAL MEDICINE | Facility: CLINIC | Age: 34
End: 2024-08-20
Payer: MEDICAID

## 2024-08-20 VITALS
WEIGHT: 195 LBS | HEIGHT: 68 IN | SYSTOLIC BLOOD PRESSURE: 130 MMHG | DIASTOLIC BLOOD PRESSURE: 82 MMHG | HEART RATE: 87 BPM | BODY MASS INDEX: 29.55 KG/M2

## 2024-08-20 DIAGNOSIS — O09.299 HX OF PREECLAMPSIA, PRIOR PREGNANCY, CURRENTLY PREGNANT: ICD-10-CM

## 2024-08-20 DIAGNOSIS — O99.810 PREDIABETES IN MOTHER DURING PREGNANCY: ICD-10-CM

## 2024-08-20 DIAGNOSIS — E66.3 OVERWEIGHT (BMI 25.0-29.9): ICD-10-CM

## 2024-08-20 DIAGNOSIS — O34.10 LEIOMYOMA IN PREGNANCY, UTERINE, ANTEPARTUM: ICD-10-CM

## 2024-08-20 DIAGNOSIS — O09.292 HISTORY OF PRETERM PREMATURE RUPTURE OF MEMBRANES (PROM) IN PREVIOUS PREGNANCY, CURRENTLY PREGNANT IN SECOND TRIMESTER: ICD-10-CM

## 2024-08-20 DIAGNOSIS — O24.414 INSULIN CONTROLLED GESTATIONAL DIABETES MELLITUS (GDM) IN SECOND TRIMESTER: ICD-10-CM

## 2024-08-20 DIAGNOSIS — Z86.32 HISTORY OF GESTATIONAL DIABETES MELLITUS (GDM) IN PRIOR PREGNANCY, CURRENTLY PREGNANT: ICD-10-CM

## 2024-08-20 DIAGNOSIS — O09.299 HISTORY OF GESTATIONAL DIABETES MELLITUS (GDM) IN PRIOR PREGNANCY, CURRENTLY PREGNANT: ICD-10-CM

## 2024-08-20 DIAGNOSIS — O10.012 PRE-EXISTING ESSENTIAL HYPERTENSION COMPLICATING PREGNANCY IN SECOND TRIMESTER: Primary | ICD-10-CM

## 2024-08-20 DIAGNOSIS — O10.012 PRE-EXISTING ESSENTIAL HYPERTENSION COMPLICATING PREGNANCY IN SECOND TRIMESTER: ICD-10-CM

## 2024-08-20 DIAGNOSIS — D25.9 LEIOMYOMA IN PREGNANCY, UTERINE, ANTEPARTUM: ICD-10-CM

## 2024-08-20 DIAGNOSIS — O09.522 MULTIGRAVIDA OF ADVANCED MATERNAL AGE IN SECOND TRIMESTER: ICD-10-CM

## 2024-08-20 DIAGNOSIS — Z14.8 CARRIER OF SPINAL MUSCULAR ATROPHY: ICD-10-CM

## 2024-08-20 PROCEDURE — 3008F BODY MASS INDEX DOCD: CPT | Mod: CPTII,S$GLB,, | Performed by: OBSTETRICS & GYNECOLOGY

## 2024-08-20 PROCEDURE — 99214 OFFICE O/P EST MOD 30 MIN: CPT | Mod: TH,S$GLB,, | Performed by: OBSTETRICS & GYNECOLOGY

## 2024-08-20 PROCEDURE — 3079F DIAST BP 80-89 MM HG: CPT | Mod: CPTII,S$GLB,, | Performed by: OBSTETRICS & GYNECOLOGY

## 2024-08-20 PROCEDURE — 76817 TRANSVAGINAL US OBSTETRIC: CPT | Mod: S$GLB,,, | Performed by: OBSTETRICS & GYNECOLOGY

## 2024-08-20 PROCEDURE — 1159F MED LIST DOCD IN RCRD: CPT | Mod: CPTII,S$GLB,, | Performed by: OBSTETRICS & GYNECOLOGY

## 2024-08-20 PROCEDURE — 1160F RVW MEDS BY RX/DR IN RCRD: CPT | Mod: CPTII,S$GLB,, | Performed by: OBSTETRICS & GYNECOLOGY

## 2024-08-20 PROCEDURE — 3075F SYST BP GE 130 - 139MM HG: CPT | Mod: CPTII,S$GLB,, | Performed by: OBSTETRICS & GYNECOLOGY

## 2024-08-20 RX ORDER — SYRINGE,SAFETY WITH NEEDLE,1ML 25GX1"
SYRINGE (EA) MISCELLANEOUS
Qty: 90 EACH | Refills: 3 | Status: SHIPPED | OUTPATIENT
Start: 2024-08-20

## 2024-08-28 DIAGNOSIS — O09.522 MULTIGRAVIDA OF ADVANCED MATERNAL AGE IN SECOND TRIMESTER: ICD-10-CM

## 2024-08-28 DIAGNOSIS — O24.414 INSULIN CONTROLLED GESTATIONAL DIABETES MELLITUS (GDM) IN SECOND TRIMESTER: Primary | ICD-10-CM

## 2024-08-28 DIAGNOSIS — O10.012 PRE-EXISTING ESSENTIAL HYPERTENSION COMPLICATING PREGNANCY IN SECOND TRIMESTER: ICD-10-CM

## 2024-08-28 DIAGNOSIS — O09.292 HISTORY OF PRETERM PREMATURE RUPTURE OF MEMBRANES (PROM) IN PREVIOUS PREGNANCY, CURRENTLY PREGNANT IN SECOND TRIMESTER: ICD-10-CM

## 2024-08-30 NOTE — PROGRESS NOTES
Maternal Fetal Medicine Follow Up    Subjective:     Patient ID: [id number]    Chief Complaint: m followup w/us      HPI: Cornelio Green is a 34 y.o. female  at 20w1d gestation with Estimated Date of Delivery: 25  who is here for follow-up consultation by M.    She is of advanced maternal age and will be 34 by the DELFINO.  She had negative cfDNA and declined amniocentesis.  She has history of 2 previous  deliveries.  Her 1st  delivery was at 35 weeks due to PPROM.  Her 2nd  delivery was at 32 weeks due to  labor. She was on Dania for her last pregnancy and delivered at term.  Her last pregnancy was also complicated by gestational hypertension and GDM. She has vague history of prediabetes vs type 2 Diabetes diagnosed in 2018 that she now reports only having gestational diabetes. On 24, she had hemoglobin A1c 6.3%, fasting glucose 128, consistent with prediabetes.  She is currently on insulin, 14 units of NPH at bedtime. She has corrective formula of 1 unit of Humalog for every 8 mg over 140.  She has CHTN, diagnosed in her last / pregnancy. She is currently on no antihypertensives. She had carrier screening that returned positive for SMA carrier.  She is considering to have FOB tested.  Her BMI was 28.43 at initial M visit. She has known fibroid uterus. She is on low dose aspirin twice daily.        Interval history since last M visit: None.. She denies any leaking fluid, vaginal bleeding, contractions, decreased fetal movement. Denies headaches, visual disturbances, or epigastric pain.    Pregnancy complications include:   Patient Active Problem List   Diagnosis    Multigravida of advanced maternal age in second trimester    (BMI 25.0-29.9)    At high risk for complications of intrauterine pregnancy (IUP)    Marijuana use during pregnancy    History of  premature rupture of membranes (PROM) in previous pregnancy, currently pregnant in second trimester     "Hx of preeclampsia, prior pregnancy, currently pregnant    History of gestational diabetes mellitus (GDM) in prior pregnancy, currently pregnant    Carrier of spinal muscular atrophy    Pre-existing essential hypertension complicating pregnancy in second trimester    Leiomyoma in pregnancy, uterine, antepartum    Prediabetes in mother during pregnancy       No changes to medical, surgical, family, social, or obstetric history.    Medications:  Current Outpatient Medications   Medication Instructions    aspirin (ECOTRIN) 81 mg, Oral, 2 times daily, Take twice a day until 34 weeks 6 days, then decrease to once daily until delivery    insulin NPH (HUMULIN N NPH U-100 INSULIN) 100 unit/mL injection INJECT 14 UNITS AT BEDTIME    insulin syringe-needle U-100 1 mL 31 gauge x 5/16 Syrg Use 1 syringe as directed to inject insulin three times daily    ONETOUCH DELICA PLUS LANCET 30 gauge Misc     ONETOUCH ULTRA TEST Strp     prenatal vit37-iron-folic acid 29 mg iron- 1 mg Chew Oral    progesterone (PROMETRIUM) 200 mg, Vaginal, Nightly       Review of Systems   12 point review of systems conducted, negative except as stated in the history of present illness. See HPI for details.      Objective:     Visit Vitals  /75 (BP Location: Left arm, Patient Position: Sitting, BP Method: X-Large (Automatic))   Pulse 79   Ht 5' 8" (1.727 m)   Wt 89 kg (196 lb 3.2 oz)   LMP  (LMP Unknown)   BMI 29.83 kg/m²        Physical Exam  Vitals and nursing note reviewed.   Constitutional:       Appearance: Normal appearance.      Comments: Increased BMI   HENT:      Head: Normocephalic and atraumatic.      Nose: Nose normal. No congestion.   Cardiovascular:      Rate and Rhythm: Normal rate.   Pulmonary:      Effort: Pulmonary effort is normal.   Skin:     Findings: No rash.   Neurological:      Mental Status: She is alert and oriented to person, place, and time.   Psychiatric:         Mood and Affect: Mood normal.         Behavior: Behavior " normal.         Thought Content: Thought content normal.         Judgment: Judgment normal.         Assessment/Plan:     34 y.o.  female with IUP at 20w1d    Advanced maternal age at 34  There is normal fetal growth and no anomalies seen on fetal anatomy scan on 9/3/24.  AFV is normal.      Reviewed risks with AMA, including risks for PTL, FGR, anomalies not seen, aneuploidy and stillbirth at term. She previously declined amniocentesis  and had negative cell free DNA.  She is aware of need for  evaluation.     Fetal surveillance as below.    Delivery timing as below.      Previous  delivery x2  Previously discussed withdrawal of 17P by FDA and new guidelines for management of previous  delivery. She is currently in cervical length surveillance. Will continue to do TVUS intermittently until 24 weeks. If cervical length less than 3 cm, consider vaginal progesterone. If less than 2.5cm, consider cerclage along with continuing vaginal progesterone.     TVUS today 2024  with normal closed cervix 3.1 cm without funneling at the IO. No evidence of cervical insufficiency at this time. Will recheck in about 2 weeks. PTL precautions reviewed.       Prediabetes vs T2DM   Risks associated with diabetes in pregnancy include higher risk for polyhydramnios, fetal macrosomia and  metabolic complications (hypoglycemia, hyperbilirubinemia, hypocalcemia, erythema).     Continue 2200 calorie ADA diet.     Log reviewed.  Fasting blood sugars are elevated up to 122 and postprandial blood sugars are more elevated in the afternoon the not with scattered other elevations up to 162 in the afternoon.  Patient advised to adjusted dose of insulin to 10 units of NPH in the morning and 20 units of NPH at bedtime. Continue corrective dose of Humalog of 1 unit for every 8 mg of sugar over 140.    She was advised to check glucose 4 times a day and send log/call with values weekly, and bring log to each  visit. The need for strict blood sugar control with goals of treatment to keep pre-prandial blood sugars between 65 and 90 and 1 hr postprandial blood sugars less than 140 was reviewed.     Low dose aspirin as discussed.    Fetal testing could be started in this setting around 30-32 weeks gestation. She was advised to do fetal kick counts 3 times daily and PRN after 24 weeks, with immediate reporting of decreased fetal movements (<10 movements/hr).       Chronic hypertension  Chronic hypertension is associated with significant adverse pregnancy outcomes including  birth, fetal growth restriction, fetal demise, placental abruption, and  delivery. Based on findings of recent CHAP Study, it is recommended to utilize 140/90 as the threshold for initiation or titration of medical therapy for chronic hypertension in pregnancy, rather than the previously recommended threshold of 160/110. For patients on blood pressure medications at the start of pregnancy, in the absence of mitigating factors or side effects, they can be maintained on their medications, rather than discontinuing them and waiting to initiate treatment for blood pressures in the severe range.    BP Readings from Last 1 Encounters:   24 121/75     With this BP, she was advised to continue low salt diet, and hold off on antihypertensive medication at this time.     Low dose aspirin as discussed.    Fetal surveillance as above.    Among patients with uncomplicated chronic hypertension with no additional risk factors, delivery at 38-39 weeks appears to provide optimal balance between the risk of adverse fetal and  outcomes.However, with multiple risk factors, will reassess closer to EDC to determine optimal timeframe or GA for delivery, based on current evaluation at that time.          SMA carrier  Previously discussed with her the inheritance pattern of SMA.  She was considering testing for father of the baby and declined  amniocentesis.  She is aware of the need for  evaluation.      BMI 28.4 at initial OB visit  Body mass index is 29.83 kg/m². With stable weight since last visit, she was advised to continue a healthy low caloric diet, low-salt and diabetic diet.  Excess weight gain would be associated with worsening hypertension, worsening gestational diabetes and adverse  outcomes, including fetal demise in utero.    Reviewed importance of FKC 3/day and prn with instructions to immediately report any decreased fetal movement.    It is important to lose weight after the pregnancy is over, especially before a future pregnancy. Breastfeeding may be an important tool in reducing the postpartum weight retention. Fetal risks were discussed with short term risk of fetal/ obesity and long term risk of adolescent component of metabolic syndrome.      At risk for preeclampsia  With her increased risk for preeclampsia, she agreed to continue asa 81 mg BID until 34 6/7 weeks, then decrease to once daily until delivery. Preeclampsia precautions reviewed.       Leiomyoma in pregnancy  Largest mean 1.9 cm on 9/3/24, stable.    Risks of fibroids include  delivery,  PROM, abnormal presentations, higher risk of  sections, placenta previa, fibroid degeneration, poor fetal growth, oligohydramnios, vaginal bleeding and postpartum hemorrhage and in rare circumstances obstruction of lower uterus/cervix, impeding vaginal delivery    Recommend fetal growth ultrasound at 32 weeks (if no other risk factors) for patients with a single large fibroid or multiple fibroids. If concern for degenerating fibroids, can administer short course of NSAID (48-72hrs of Indocin) prior to 32 weeks gestation.     Reviewed instructions to report any increased pain, cramps, vaginal discharge or bleeding. Emphasized the importance of monitoring fetal kick count activity, and reporting immediately if decreased fetal movement  occurs.    Blood sugars are not well controlled and adjustments were done on dose of insulin.  We will keep corrective dose of insulin of 1 unit for every 8 mg of sugar over 140.  Blood pressure is stable.  Continue aspirin b.i.d..  Fibroid is stable.    Follow up in about 2 weeks (around 9/17/2024) for MFM Followup, TVUS.     No future appointments.     SARAH involvement: Patient was evaluated and examined by Dr. Wilson. ALEJANDRO Butler, helped in pre charting of part of note.    This note was created with the assistance of [employer] voice recognition software. There may be transcription errors as a result of using this technology, however minimal. Effort has been made to ensure accuracy of transcription, but any obvious errors or omissions should be clarified with the author of the document.

## 2024-09-03 ENCOUNTER — OFFICE VISIT (OUTPATIENT)
Dept: MATERNAL FETAL MEDICINE | Facility: CLINIC | Age: 34
End: 2024-09-03
Payer: MEDICAID

## 2024-09-03 ENCOUNTER — PROCEDURE VISIT (OUTPATIENT)
Dept: MATERNAL FETAL MEDICINE | Facility: CLINIC | Age: 34
End: 2024-09-03
Payer: MEDICAID

## 2024-09-03 VITALS
WEIGHT: 196.19 LBS | BODY MASS INDEX: 29.73 KG/M2 | HEART RATE: 79 BPM | SYSTOLIC BLOOD PRESSURE: 121 MMHG | HEIGHT: 68 IN | DIASTOLIC BLOOD PRESSURE: 75 MMHG

## 2024-09-03 DIAGNOSIS — D25.9 LEIOMYOMA IN PREGNANCY, UTERINE, ANTEPARTUM: ICD-10-CM

## 2024-09-03 DIAGNOSIS — O99.320 MARIJUANA USE DURING PREGNANCY: ICD-10-CM

## 2024-09-03 DIAGNOSIS — Z14.8 CARRIER OF SPINAL MUSCULAR ATROPHY: ICD-10-CM

## 2024-09-03 DIAGNOSIS — O09.299 HX OF PREECLAMPSIA, PRIOR PREGNANCY, CURRENTLY PREGNANT: ICD-10-CM

## 2024-09-03 DIAGNOSIS — F12.90 MARIJUANA USE DURING PREGNANCY: ICD-10-CM

## 2024-09-03 DIAGNOSIS — E66.3 OVERWEIGHT (BMI 25.0-29.9): ICD-10-CM

## 2024-09-03 DIAGNOSIS — Z86.32 HISTORY OF GESTATIONAL DIABETES MELLITUS (GDM) IN PRIOR PREGNANCY, CURRENTLY PREGNANT: ICD-10-CM

## 2024-09-03 DIAGNOSIS — O10.012 PRE-EXISTING ESSENTIAL HYPERTENSION COMPLICATING PREGNANCY IN SECOND TRIMESTER: Primary | ICD-10-CM

## 2024-09-03 DIAGNOSIS — O09.292 HISTORY OF PRETERM PREMATURE RUPTURE OF MEMBRANES (PROM) IN PREVIOUS PREGNANCY, CURRENTLY PREGNANT IN SECOND TRIMESTER: ICD-10-CM

## 2024-09-03 DIAGNOSIS — O09.522 MULTIGRAVIDA OF ADVANCED MATERNAL AGE IN SECOND TRIMESTER: ICD-10-CM

## 2024-09-03 DIAGNOSIS — O24.414 INSULIN CONTROLLED GESTATIONAL DIABETES MELLITUS (GDM) IN SECOND TRIMESTER: ICD-10-CM

## 2024-09-03 DIAGNOSIS — O09.299 HISTORY OF GESTATIONAL DIABETES MELLITUS (GDM) IN PRIOR PREGNANCY, CURRENTLY PREGNANT: ICD-10-CM

## 2024-09-03 DIAGNOSIS — O34.10 LEIOMYOMA IN PREGNANCY, UTERINE, ANTEPARTUM: ICD-10-CM

## 2024-09-03 DIAGNOSIS — O99.810 PREDIABETES IN MOTHER DURING PREGNANCY: ICD-10-CM

## 2024-09-03 DIAGNOSIS — O10.012 PRE-EXISTING ESSENTIAL HYPERTENSION COMPLICATING PREGNANCY IN SECOND TRIMESTER: ICD-10-CM

## 2024-09-03 PROCEDURE — 3008F BODY MASS INDEX DOCD: CPT | Mod: CPTII,S$GLB,, | Performed by: OBSTETRICS & GYNECOLOGY

## 2024-09-03 PROCEDURE — 99214 OFFICE O/P EST MOD 30 MIN: CPT | Mod: TH,S$GLB,, | Performed by: OBSTETRICS & GYNECOLOGY

## 2024-09-03 PROCEDURE — 3074F SYST BP LT 130 MM HG: CPT | Mod: CPTII,S$GLB,, | Performed by: OBSTETRICS & GYNECOLOGY

## 2024-09-03 PROCEDURE — 1159F MED LIST DOCD IN RCRD: CPT | Mod: CPTII,S$GLB,, | Performed by: OBSTETRICS & GYNECOLOGY

## 2024-09-03 PROCEDURE — 3078F DIAST BP <80 MM HG: CPT | Mod: CPTII,S$GLB,, | Performed by: OBSTETRICS & GYNECOLOGY

## 2024-09-03 PROCEDURE — 76817 TRANSVAGINAL US OBSTETRIC: CPT | Mod: S$GLB,,, | Performed by: OBSTETRICS & GYNECOLOGY

## 2024-09-03 PROCEDURE — 76811 OB US DETAILED SNGL FETUS: CPT | Mod: S$GLB,,, | Performed by: OBSTETRICS & GYNECOLOGY

## 2024-09-03 PROCEDURE — 1160F RVW MEDS BY RX/DR IN RCRD: CPT | Mod: CPTII,S$GLB,, | Performed by: OBSTETRICS & GYNECOLOGY

## 2024-09-03 RX ORDER — BLOOD SUGAR DIAGNOSTIC
STRIP MISCELLANEOUS
COMMUNITY
Start: 2024-08-30

## 2024-09-03 RX ORDER — LANCETS 33 GAUGE
EACH MISCELLANEOUS
COMMUNITY
Start: 2024-08-30

## 2024-09-12 DIAGNOSIS — O09.292 HISTORY OF PRETERM PREMATURE RUPTURE OF MEMBRANES (PROM) IN PREVIOUS PREGNANCY, CURRENTLY PREGNANT IN SECOND TRIMESTER: ICD-10-CM

## 2024-09-12 DIAGNOSIS — O24.414 INSULIN CONTROLLED GESTATIONAL DIABETES MELLITUS (GDM) IN SECOND TRIMESTER: ICD-10-CM

## 2024-09-12 DIAGNOSIS — O10.012 PRE-EXISTING ESSENTIAL HYPERTENSION COMPLICATING PREGNANCY IN SECOND TRIMESTER: Primary | ICD-10-CM

## 2024-09-12 DIAGNOSIS — O09.522 MULTIGRAVIDA OF ADVANCED MATERNAL AGE IN SECOND TRIMESTER: ICD-10-CM

## 2024-09-17 ENCOUNTER — TELEPHONE (OUTPATIENT)
Dept: MATERNAL FETAL MEDICINE | Facility: CLINIC | Age: 34
End: 2024-09-17
Payer: MEDICAID

## 2024-10-02 DIAGNOSIS — O10.012 PRE-EXISTING ESSENTIAL HYPERTENSION COMPLICATING PREGNANCY IN SECOND TRIMESTER: Primary | ICD-10-CM

## 2024-10-03 RX ORDER — PROGESTERONE 200 MG/1
200 CAPSULE ORAL NIGHTLY
Qty: 30 CAPSULE | Refills: 5 | Status: SHIPPED | OUTPATIENT
Start: 2024-10-03 | End: 2025-04-01

## 2024-10-03 NOTE — PROGRESS NOTES
Maternal Fetal Medicine Follow Up    Subjective:     Patient ID: [id number]    Chief Complaint: MFM follow up w/us  (Fasting's up to 117 and 1 hr post meals 166/)      HPI: Cornelio Green is a 34 y.o. female  at 24w4d gestation with Estimated Date of Delivery: 25  who is here for follow-up consultation by MFM.    She is of advanced maternal age and will be 34 by the DELFINO.  She had negative cfDNA and declined amniocentesis.  She has history of 2 previous  deliveries.  Her 1st  delivery was at 35 weeks due to PPROM.  Her 2nd  delivery was at 32 weeks due to  labor. She was on Dania for her last pregnancy and delivered at term.  She had cervical incompetence ruled out this pregnancy.  Her last pregnancy was also complicated by gestational hypertension and GDM. She has vague history of prediabetes vs type 2 Diabetes diagnosed in 2018 that she now reports only having gestational diabetes. On 24, she had hemoglobin A1c 6.3%, fasting glucose 128, consistent with prediabetes.  She is currently on insulin, 10 units of NPH in the morning and 20 units of NPH at bedtime. She has corrective formula of 1 unit of Humalog for every 8 mg over 140.  She has CHTN, diagnosed in her last / pregnancy. She is currently on no antihypertensives. She had carrier screening that returned positive for SMA carrier.  She is considering to have FOB tested.  Her BMI was 28.43 at initial MFM visit. She has known fibroid uterus. She is on low dose aspirin twice daily.        Interval history since last M visit: None.. She denies any leaking fluid, vaginal bleeding, contractions, decreased fetal movement. Denies headaches, visual disturbances, or epigastric pain.    Pregnancy complications include:   Patient Active Problem List   Diagnosis    Multigravida of advanced maternal age in second trimester    (BMI 25.0-29.9)    At high risk for complications of intrauterine pregnancy (IUP)    Marijuana use  "during pregnancy    History of  premature rupture of membranes (PROM) in previous pregnancy, currently pregnant in second trimester    Hx of preeclampsia, prior pregnancy, currently pregnant    History of gestational diabetes mellitus (GDM) in prior pregnancy, currently pregnant    Carrier of spinal muscular atrophy    Pre-existing essential hypertension complicating pregnancy in second trimester    Leiomyoma in pregnancy, uterine, antepartum    Prediabetes in mother during pregnancy       No changes to medical, surgical, family, social, or obstetric history.    Medications:  Current Outpatient Medications   Medication Instructions    aspirin (ECOTRIN) 81 mg, Oral, 2 times daily, Take twice a day until 34 weeks 6 days, then decrease to once daily until delivery    insulin NPH (HUMULIN N NPH U-100 INSULIN) 100 unit/mL injection INJECT 14 UNITS AT BEDTIME    insulin syringe-needle U-100 1 mL 31 gauge x 5/16 Syrg Use 1 syringe as directed to inject insulin three times daily    ONETOUCH DELICA PLUS LANCET 30 gauge Misc     ONETOUCH ULTRA TEST Strp     prenatal vit37-iron-folic acid 29 mg iron- 1 mg Chew Take by mouth.    progesterone (PROMETRIUM) 200 mg, Vaginal, Nightly       Review of Systems   12 point review of systems conducted, negative except as stated in the history of present illness. See HPI for details.      Objective:     Visit Vitals  /73 (BP Location: Left arm)   Pulse 75   Ht 5' 8" (1.727 m)   Wt 89.8 kg (198 lb)   LMP  (LMP Unknown)   BMI 30.11 kg/m²        Physical Exam  Vitals and nursing note reviewed.   Constitutional:       Appearance: Normal appearance.      Comments: Increased BMI   HENT:      Head: Normocephalic and atraumatic.      Nose: Nose normal. No congestion.   Cardiovascular:      Rate and Rhythm: Normal rate.   Pulmonary:      Effort: Pulmonary effort is normal.   Skin:     Findings: No rash.   Neurological:      Mental Status: She is alert and oriented to person, place, and " time.   Psychiatric:         Mood and Affect: Mood normal.         Behavior: Behavior normal.         Thought Content: Thought content normal.         Judgment: Judgment normal.         Assessment/Plan:     34 y.o.  female with IUP at 24w4d    Advanced maternal age at 34  There is normal fetal growth with an EFW of 771 g at the 56% and the AC at the 67% on 10/4/24.  AFV is normal.      Reviewed risks with AMA, including risks for PTL, FGR, anomalies not seen, aneuploidy and stillbirth at term. She previously declined amniocentesis  and had negative cell free DNA.  She is aware of need for  evaluation.     Fetal surveillance as below.    Delivery timing as below.      Previous  delivery x2  She is aware of the risk of recurrence of  delivery.  She had cervical length surveillance, which ruled out cervical incompetence..  PTL precautions reviewed.      Prediabetes vs T2DM   Risks associated with diabetes in pregnancy include higher risk for polyhydramnios, fetal macrosomia and  metabolic complications (hypoglycemia, hyperbilirubinemia, hypocalcemia, erythema).     Continue 2200 calorie ADA diet.     Log reviewed.  Blood sugars are low in the afternoon and mildly elevated in the morning.  Patient advised to I adjusted dose of insulin to 26 units of NPH at bedtime.  (stopped NPH in a.m..)  Continue corrective dose of Humalog of 1 unit for every 8 mg of sugar over 140.    She was advised to check glucose 4 times a day and send log/call with values weekly, and bring log to each visit. The need for strict blood sugar control with goals of treatment to keep pre-prandial blood sugars between 65 and 90 and 1 hr postprandial blood sugars less than 140 was reviewed.     Low dose aspirin as discussed.    Fetal testing could be started in this setting around 30-32 weeks gestation. She was advised to do fetal kick counts 3 times daily and PRN after 24 weeks, with immediate reporting of  decreased fetal movements (<10 movements/hr).       Chronic hypertension  Chronic hypertension is associated with significant adverse pregnancy outcomes including  birth, fetal growth restriction, fetal demise, placental abruption, and  delivery. Based on findings of recent CHAP Study, it is recommended to utilize 140/90 as the threshold for initiation or titration of medical therapy for chronic hypertension in pregnancy, rather than the previously recommended threshold of 160/110. For patients on blood pressure medications at the start of pregnancy, in the absence of mitigating factors or side effects, they can be maintained on their medications, rather than discontinuing them and waiting to initiate treatment for blood pressures in the severe range.    BP Readings from Last 1 Encounters:   10/04/24 120/73     With this BP, she was advised to continue low salt diet, and hold off on antihypertensive medication at this time.     Low dose aspirin as discussed.    Fetal surveillance as above.    Among patients with uncomplicated chronic hypertension with no additional risk factors, delivery at 38-39 weeks appears to provide optimal balance between the risk of adverse fetal and  outcomes.However, with multiple risk factors, will reassess closer to EDC to determine optimal timeframe or GA for delivery, based on current evaluation at that time.          SMA carrier  Previously discussed with her the inheritance pattern of SMA.  She was considering testing for father of the baby and declined amniocentesis.  She is aware of the need for  evaluation.      BMI 28.4 at initial OB visit  Body mass index is 30.11 kg/m². With stable weight since last visit, she was advised to continue a healthy low caloric diet, low-salt and diabetic diet.  Excess weight gain would be associated with worsening hypertension, worsening gestational diabetes and adverse  outcomes, including fetal demise in  utero.    Reviewed importance of FKC 3/day and prn with instructions to immediately report any decreased fetal movement.    It is important to lose weight after the pregnancy is over, especially before a future pregnancy. Breastfeeding may be an important tool in reducing the postpartum weight retention. Fetal risks were discussed with short term risk of fetal/ obesity and long term risk of adolescent component of metabolic syndrome.      At risk for preeclampsia  With her increased risk for preeclampsia, she agreed to continue asa 81 mg BID until 34 6/7 weeks, then decrease to once daily until delivery. Preeclampsia precautions reviewed.       Leiomyoma in pregnancy  Largest mean 2 cm on 10/4/24, stable.     Risks of fibroids include  delivery,  PROM, abnormal presentations, higher risk of  sections, placenta previa, fibroid degeneration, poor fetal growth, oligohydramnios, vaginal bleeding and postpartum hemorrhage and in rare circumstances obstruction of lower uterus/cervix, impeding vaginal delivery    Recommend fetal growth ultrasound at 32 weeks (if no other risk factors) for patients with a single large fibroid or multiple fibroids. If concern for degenerating fibroids, can administer short course of NSAID (48-72hrs of Indocin) prior to 32 weeks gestation.     Reviewed instructions to report any increased pain, cramps, vaginal discharge or bleeding. Emphasized the importance of monitoring fetal kick count activity, and reporting immediately if decreased fetal movement occurs.      Blood pressure is stable patient continue low-salt diet.  Blood sugar was elevated in the morning and low in the afternoon I adjusted dose of insulin as above.  Patient will continue aspirin b.i.d..  Fibroids appear to be stable.  We will follow patient again in 2 weeks    Follow up in about 2 weeks (around 10/18/2024) for MFM follow-up, Virtual Visit; 4 weeks MFM follow-up repeat ultrasound.     Future  Appointments   Date Time Provider Department Center   10/18/2024  9:45 AM Bradford Wilson MD [facility] [place]   10/29/2024  9:30 AM Bradford Wilson MD [facility] [place]   10/29/2024  9:30 AM ROOM 1, [facility] [place]     SARAH involvement: Patient was evaluated and examined by Dr. Wilson. ALEJANDRO Butler, helped in pre charting of part of note.    This note was created with the assistance of [employer] voice recognition software. There may be transcription errors as a result of using this technology, however minimal. Effort has been made to ensure accuracy of transcription, but any obvious errors or omissions should be clarified with the author of the document.

## 2024-10-04 ENCOUNTER — PROCEDURE VISIT (OUTPATIENT)
Dept: MATERNAL FETAL MEDICINE | Facility: CLINIC | Age: 34
End: 2024-10-04
Payer: MEDICAID

## 2024-10-04 ENCOUNTER — OFFICE VISIT (OUTPATIENT)
Dept: MATERNAL FETAL MEDICINE | Facility: CLINIC | Age: 34
End: 2024-10-04
Payer: MEDICAID

## 2024-10-04 VITALS
SYSTOLIC BLOOD PRESSURE: 120 MMHG | DIASTOLIC BLOOD PRESSURE: 73 MMHG | WEIGHT: 198 LBS | HEART RATE: 75 BPM | BODY MASS INDEX: 30.01 KG/M2 | HEIGHT: 68 IN

## 2024-10-04 DIAGNOSIS — Z86.32 HISTORY OF GESTATIONAL DIABETES MELLITUS (GDM) IN PRIOR PREGNANCY, CURRENTLY PREGNANT: ICD-10-CM

## 2024-10-04 DIAGNOSIS — O10.012 PRE-EXISTING ESSENTIAL HYPERTENSION COMPLICATING PREGNANCY IN SECOND TRIMESTER: ICD-10-CM

## 2024-10-04 DIAGNOSIS — O09.522 MULTIGRAVIDA OF ADVANCED MATERNAL AGE IN SECOND TRIMESTER: ICD-10-CM

## 2024-10-04 DIAGNOSIS — O10.012 PRE-EXISTING ESSENTIAL HYPERTENSION COMPLICATING PREGNANCY IN SECOND TRIMESTER: Primary | ICD-10-CM

## 2024-10-04 DIAGNOSIS — O24.414 INSULIN CONTROLLED GESTATIONAL DIABETES MELLITUS (GDM) IN SECOND TRIMESTER: Primary | ICD-10-CM

## 2024-10-04 DIAGNOSIS — D25.9 LEIOMYOMA IN PREGNANCY, UTERINE, ANTEPARTUM: ICD-10-CM

## 2024-10-04 DIAGNOSIS — O09.299 HX OF PREECLAMPSIA, PRIOR PREGNANCY, CURRENTLY PREGNANT: ICD-10-CM

## 2024-10-04 DIAGNOSIS — O34.10 LEIOMYOMA IN PREGNANCY, UTERINE, ANTEPARTUM: ICD-10-CM

## 2024-10-04 DIAGNOSIS — Z14.8 CARRIER OF SPINAL MUSCULAR ATROPHY: ICD-10-CM

## 2024-10-04 DIAGNOSIS — O09.299 HISTORY OF GESTATIONAL DIABETES MELLITUS (GDM) IN PRIOR PREGNANCY, CURRENTLY PREGNANT: ICD-10-CM

## 2024-10-04 DIAGNOSIS — E66.3 OVERWEIGHT (BMI 25.0-29.9): ICD-10-CM

## 2024-10-04 DIAGNOSIS — O99.810 PREDIABETES IN MOTHER DURING PREGNANCY: ICD-10-CM

## 2024-10-04 DIAGNOSIS — O09.292 HISTORY OF PRETERM PREMATURE RUPTURE OF MEMBRANES (PROM) IN PREVIOUS PREGNANCY, CURRENTLY PREGNANT IN SECOND TRIMESTER: ICD-10-CM

## 2024-10-04 LAB — GLUCOSE SERPL-MCNC: 107 MG/DL (ref 70–110)

## 2024-10-04 RX ORDER — BLOOD SUGAR DIAGNOSTIC
1 STRIP MISCELLANEOUS 4 TIMES DAILY
Qty: 120 STRIP | Refills: 3 | Status: SHIPPED | OUTPATIENT
Start: 2024-10-04 | End: 2024-11-03

## 2024-10-04 RX ORDER — LANCETS 33 GAUGE
1 EACH MISCELLANEOUS 4 TIMES DAILY
Qty: 120 EACH | Refills: 3 | Status: SHIPPED | OUTPATIENT
Start: 2024-10-04 | End: 2024-11-03

## 2024-10-17 NOTE — PROGRESS NOTES
Maternal Fetal Medicine Follow Up via Telemedicine    The patient location is: home [place]  The chief complaint leading to consultation is:  Type 2 diabetes mellitus , advanced maternal age    Visit type: audiovisual    Face to Face time with patient:  7 minutes 13 sec    Each patient to whom he or she provides medical services by telemedicine is:  (1) informed of the relationship between the physician and patient and the respective role of any other health care provider with respect to management of the patient; and (2) notified that he or she may decline to receive medical services by telemedicine and may withdraw from such care at any time.    Notes:       Subjective:     Patient ID: [id number]    Chief Complaint: M Virtual visit (Type 2 Diabetes.)      HPI: Cornelio Green is a 34 y.o. female  at 26w4d gestation with Estimated Date of Delivery: 25  who is here for follow-up consultation by [facility].    She is of advanced maternal age and will be 34 by the DELFINO.  She had negative cfDNA and declined amniocentesis.  She has history of 2 previous  deliveries.  Her 1st  delivery was at 35 weeks due to PPROM.  Her 2nd  delivery was at 32 weeks due to  labor. She was on [place] for her last pregnancy and delivered at term.  She has elected to use vaginal progesterone this pregnancy.  She had cervical incompetence ruled out this pregnancy.  Her last pregnancy was also complicated by gestational hypertension and GDM. She has vague history of prediabetes vs type 2 Diabetes diagnosed in 2018 that she now reports only having gestational diabetes. On 24, she had hemoglobin A1c 6.3%, fasting glucose 128, consistent with prediabetes.  She is currently on insulin,  26 units of NPH at bedtime. She has corrective formula of 1 unit of Humalog for every 8 mg over 140.  She has CHTN, diagnosed in her last / pregnancy. She is currently on no antihypertensives. She had carrier screening that  returned positive for SMA carrier.  She is considering to have FOB tested.  Her BMI was 28.43 at initial [facility] visit. She has known fibroid uterus. She is on low dose aspirin twice daily.        Interval history since last M visit: None.. She denies any leaking fluid, vaginal bleeding, contractions, decreased fetal movement. Denies headaches, visual disturbances, or epigastric pain.    Pregnancy complications include:   Patient Active Problem List   Diagnosis    Multigravida of advanced maternal age in second trimester    (BMI 25.0-29.9)    At high risk for complications of intrauterine pregnancy (IUP)    Marijuana use during pregnancy    History of  premature rupture of membranes (PROM) in previous pregnancy, currently pregnant in second trimester    Hx of preeclampsia, prior pregnancy, currently pregnant    History of gestational diabetes mellitus (GDM) in prior pregnancy, currently pregnant    Carrier of spinal muscular atrophy    Pre-existing essential hypertension complicating pregnancy in second trimester    Leiomyoma in pregnancy, uterine, antepartum    Prediabetes in mother during pregnancy       No changes to medical, surgical, family, social, or obstetric history.    Medications:  Current Outpatient Medications   Medication Instructions    aspirin (ECOTRIN) 81 mg, Oral, 2 times daily, Take twice a day until 34 weeks 6 days, then decrease to once daily until delivery    insulin NPH (HUMULIN N NPH U-100 INSULIN) 100 unit/mL injection INJECT 14 UNITS AT BEDTIME    insulin syringe-needle U-100 1 mL 31 gauge x 5/16 Syrg Use 1 syringe as directed to inject insulin three times daily    ONETOUCH DELICA PLUS LANCET 30 gauge Misc 1 lancet , skin prick, 4 times daily    ONETOUCH ULTRA TEST Strp 1 strip, Misc.(Non-Drug; Combo Route), 4 times daily    prenatal vit37-iron-folic acid 29 mg iron- 1 mg Chew Take by mouth.    progesterone (PROMETRIUM) 200 mg, Vaginal, Nightly       Review of Systems   12 point review  of systems conducted, negative except as stated in the history of present illness. See HPI for details.      Objective:     Visit Vitals  LMP  (LMP Unknown)        Physical Exam  Vitals and nursing note reviewed.   Constitutional:       Appearance: Normal appearance.      Comments: Increased BMI   HENT:      Head: Normocephalic and atraumatic.      Nose: Nose normal. No congestion.   Cardiovascular:      Rate and Rhythm: Normal rate.   Pulmonary:      Effort: Pulmonary effort is normal.   Skin:     Findings: No rash.   Neurological:      Mental Status: She is alert and oriented to person, place, and time.   Psychiatric:         Mood and Affect: Mood normal.         Behavior: Behavior normal.         Thought Content: Thought content normal.         Judgment: Judgment normal.         Assessment/Plan:     34 y.o.  female with IUP at 26w4d    Advanced maternal age at 34  There is normal fetal growth with an EFW of 771 g at the 56% and the AC at the 67% on 10/4/24.  AFV is normal.      Reviewed risks with AMA, including risks for PTL, FGR, anomalies not seen, aneuploidy and stillbirth at term. She previously declined amniocentesis  and had negative cell free DNA.  She is aware of need for  evaluation.     Fetal surveillance as below.    Delivery timing as below.      Previous  delivery x2  She is aware of the risk of recurrence of  delivery.  She had cervical length surveillance, which ruled out cervical incompetence.  She will continue at the vaginal progesterone until 36 weeks 6 days.  PTL precautions reviewed.      Prediabetes vs T2DM   Risks associated with diabetes in pregnancy include higher risk for polyhydramnios, fetal macrosomia and  metabolic complications (hypoglycemia, hyperbilirubinemia, hypocalcemia, erythema).     Continue 2200 calorie ADA diet.     Log reviewed.  Fasting blood sugars are elevated up to 120.  There was 1 reading at 1:50 a.m. do that I suspect she ate  after midnight  Patient was advised to adjust to 32 units of NPH at bedtime. Continue corrective dose of Humalog of 1 unit for every 8 mg of sugar over 140.    With all postprandial blood sugars still normal patient was advised that she could check her sugars 2 times a day where she checks daily fasting, and 1 additional postprandial blood sugar rotating different meals.  She was advised to check glucose 2 times a day at alternating times of the day and send log/call with values weekly, and bring log to each visit. The need for strict blood sugar control with goals of treatment to keep pre-prandial blood sugars between 65 and 90 and 1 hr postprandial blood sugars less than 140 was reviewed.    Patient was advised to check her sugar 1 time also at 3 in the morning just 1 time.     Low dose aspirin as discussed.    Fetal testing could be started in this setting around 30-32 weeks gestation. She was advised to do fetal kick counts 3 times daily and PRN after 24 weeks, with immediate reporting of decreased fetal movements (<10 movements/hr).       Chronic hypertension  Chronic hypertension is associated with significant adverse pregnancy outcomes including  birth, fetal growth restriction, fetal demise, placental abruption, and  delivery. Based on findings of recent CHAP Study, it is recommended to utilize 140/90 as the threshold for initiation or titration of medical therapy for chronic hypertension in pregnancy, rather than the previously recommended threshold of 160/110. For patients on blood pressure medications at the start of pregnancy, in the absence of mitigating factors or side effects, they can be maintained on their medications, rather than discontinuing them and waiting to initiate treatment for blood pressures in the severe range.    BP Readings from Last 1 Encounters:   10/04/24 120/73     With this BP, she was advised to continue low salt diet, and hold off on antihypertensive medication at  this time.     Low dose aspirin as discussed.    Fetal surveillance as above.    Among patients with uncomplicated chronic hypertension with no additional risk factors, delivery at 38-39 weeks appears to provide optimal balance between the risk of adverse fetal and  outcomes.However, with multiple risk factors, will reassess closer to EDC to determine optimal timeframe or GA for delivery, based on current evaluation at that time.          SMA carrier  Previously discussed with her the inheritance pattern of SMA.  She was considering testing for father of the baby and declined amniocentesis.  She is aware of the need for  evaluation.      BMI 28.4 at initial OB visit  There is no height or weight on file to calculate BMI. With stable weight since last visit, she was advised to continue a healthy low caloric diet, low-salt and diabetic diet.  Excess weight gain would be associated with worsening hypertension, worsening gestational diabetes and adverse  outcomes, including fetal demise in utero.    Reviewed importance of FKC 3/day and prn with instructions to immediately report any decreased fetal movement.    It is important to lose weight after the pregnancy is over, especially before a future pregnancy. Breastfeeding may be an important tool in reducing the postpartum weight retention. Fetal risks were discussed with short term risk of fetal/ obesity and long term risk of adolescent component of metabolic syndrome.      At risk for preeclampsia  With her increased risk for preeclampsia, she agreed to continue asa 81 mg BID until 34 6/7 weeks, then decrease to once daily until delivery. Preeclampsia precautions reviewed.       Leiomyoma in pregnancy  Largest mean 2 cm on 10/4/24, stable.     Risks of fibroids include  delivery,  PROM, abnormal presentations, higher risk of  sections, placenta previa, fibroid degeneration, poor fetal growth, oligohydramnios,  vaginal bleeding and postpartum hemorrhage and in rare circumstances obstruction of lower uterus/cervix, impeding vaginal delivery    Recommend fetal growth ultrasound at 32 weeks (if no other risk factors) for patients with a single large fibroid or multiple fibroids. If concern for degenerating fibroids, can administer short course of NSAID (48-72hrs of Indocin) prior to 32 weeks gestation.     Reviewed instructions to report any increased pain, cramps, vaginal discharge or bleeding. Emphasized the importance of monitoring fetal kick count activity, and reporting immediately if decreased fetal movement occurs.    Elevated fasting blood sugars and I adjusted the bedtime dose of insulin as mentioned above.  Refill for Prometrium 200 mg q.h.s..  She has no symptoms of  labor.  Continue aspirin b.i.d..  Patient was advised to keep her follow-up appointment that is scheduled later this month.  We will plan to have a follow-up hemoglobin A1c next visit.    Follow up for keep return appointment.     Future Appointments   Date Time Provider Department Center   10/29/2024  9:30 AM Bradford Wilson MD [facility]   10/29/2024  9:30 AM ROOM 1, [facility]     SARAH involvement: Patient was evaluated and examined by Dr. Wilson. ALEJANDRO Butler, helped in pre charting of part of note.    This note was created with the assistance of [employer] voice recognition software. There may be transcription errors as a result of using this technology, however minimal. Effort has been made to ensure accuracy of transcription, but any obvious errors or omissions should be clarified with the author of the document.

## 2024-10-18 ENCOUNTER — OFFICE VISIT (OUTPATIENT)
Dept: MATERNAL FETAL MEDICINE | Facility: CLINIC | Age: 34
End: 2024-10-18
Payer: MEDICAID

## 2024-10-18 ENCOUNTER — PATIENT MESSAGE (OUTPATIENT)
Dept: MATERNAL FETAL MEDICINE | Facility: CLINIC | Age: 34
End: 2024-10-18

## 2024-10-18 DIAGNOSIS — D25.9 LEIOMYOMA IN PREGNANCY, UTERINE, ANTEPARTUM: ICD-10-CM

## 2024-10-18 DIAGNOSIS — O09.292 HISTORY OF PRETERM PREMATURE RUPTURE OF MEMBRANES (PROM) IN PREVIOUS PREGNANCY, CURRENTLY PREGNANT IN SECOND TRIMESTER: ICD-10-CM

## 2024-10-18 DIAGNOSIS — O09.292 HISTORY OF PRETERM PREMATURE RUPTURE OF MEMBRANES (PROM) IN PREVIOUS PREGNANCY, CURRENTLY PREGNANT IN SECOND TRIMESTER: Primary | ICD-10-CM

## 2024-10-18 DIAGNOSIS — O34.10 LEIOMYOMA IN PREGNANCY, UTERINE, ANTEPARTUM: ICD-10-CM

## 2024-10-18 DIAGNOSIS — Z14.8 CARRIER OF SPINAL MUSCULAR ATROPHY: ICD-10-CM

## 2024-10-18 DIAGNOSIS — F12.90 MARIJUANA USE DURING PREGNANCY: ICD-10-CM

## 2024-10-18 DIAGNOSIS — O09.299 HISTORY OF GESTATIONAL DIABETES MELLITUS (GDM) IN PRIOR PREGNANCY, CURRENTLY PREGNANT: ICD-10-CM

## 2024-10-18 DIAGNOSIS — O10.012 PRE-EXISTING ESSENTIAL HYPERTENSION COMPLICATING PREGNANCY IN SECOND TRIMESTER: Primary | ICD-10-CM

## 2024-10-18 DIAGNOSIS — Z86.32 HISTORY OF GESTATIONAL DIABETES MELLITUS (GDM) IN PRIOR PREGNANCY, CURRENTLY PREGNANT: ICD-10-CM

## 2024-10-18 DIAGNOSIS — O09.522 MULTIGRAVIDA OF ADVANCED MATERNAL AGE IN SECOND TRIMESTER: ICD-10-CM

## 2024-10-18 DIAGNOSIS — O99.320 MARIJUANA USE DURING PREGNANCY: ICD-10-CM

## 2024-10-18 DIAGNOSIS — O09.299 HX OF PREECLAMPSIA, PRIOR PREGNANCY, CURRENTLY PREGNANT: ICD-10-CM

## 2024-10-18 DIAGNOSIS — O99.810 PREDIABETES IN MOTHER DURING PREGNANCY: ICD-10-CM

## 2024-10-18 RX ORDER — PROGESTERONE 200 MG/1
200 CAPSULE ORAL NIGHTLY
Qty: 30 CAPSULE | Refills: 5 | Status: SHIPPED | OUTPATIENT
Start: 2024-10-18 | End: 2025-04-16

## 2024-10-18 RX ORDER — PROGESTERONE 200 MG/1
200 CAPSULE ORAL NIGHTLY
Qty: 30 CAPSULE | Refills: 5 | OUTPATIENT
Start: 2024-10-18 | End: 2025-04-16

## 2024-10-23 DIAGNOSIS — O10.012 PRE-EXISTING ESSENTIAL HYPERTENSION COMPLICATING PREGNANCY IN SECOND TRIMESTER: Primary | ICD-10-CM

## 2024-10-28 PROBLEM — O10.013 PRE-EXISTING ESSENTIAL HYPERTENSION AFFECTING PREGNANCY IN THIRD TRIMESTER: Status: ACTIVE | Noted: 2024-07-23

## 2024-10-28 PROBLEM — O09.523 MULTIGRAVIDA OF ADVANCED MATERNAL AGE IN THIRD TRIMESTER: Status: ACTIVE | Noted: 2024-07-22

## 2024-10-28 PROBLEM — O09.293 HISTORY OF PRETERM PREMATURE RUPTURE OF MEMBRANES (PROM) IN PREVIOUS PREGNANCY, CURRENTLY PREGNANT IN THIRD TRIMESTER: Status: ACTIVE | Noted: 2024-07-22

## 2024-10-29 ENCOUNTER — OFFICE VISIT (OUTPATIENT)
Dept: MATERNAL FETAL MEDICINE | Facility: CLINIC | Age: 34
End: 2024-10-29
Payer: MEDICAID

## 2024-10-29 ENCOUNTER — PROCEDURE VISIT (OUTPATIENT)
Dept: MATERNAL FETAL MEDICINE | Facility: CLINIC | Age: 34
End: 2024-10-29
Payer: MEDICAID

## 2024-10-29 VITALS
HEART RATE: 87 BPM | BODY MASS INDEX: 30.46 KG/M2 | DIASTOLIC BLOOD PRESSURE: 70 MMHG | SYSTOLIC BLOOD PRESSURE: 117 MMHG | WEIGHT: 201 LBS | HEIGHT: 68 IN

## 2024-10-29 DIAGNOSIS — O09.299 HISTORY OF GESTATIONAL DIABETES MELLITUS (GDM) IN PRIOR PREGNANCY, CURRENTLY PREGNANT: ICD-10-CM

## 2024-10-29 DIAGNOSIS — E66.3 OVERWEIGHT (BMI 25.0-29.9): ICD-10-CM

## 2024-10-29 DIAGNOSIS — Z86.32 HISTORY OF GESTATIONAL DIABETES MELLITUS (GDM) IN PRIOR PREGNANCY, CURRENTLY PREGNANT: ICD-10-CM

## 2024-10-29 DIAGNOSIS — O09.523 MULTIGRAVIDA OF ADVANCED MATERNAL AGE IN THIRD TRIMESTER: ICD-10-CM

## 2024-10-29 DIAGNOSIS — O10.013 PRE-EXISTING ESSENTIAL HYPERTENSION AFFECTING PREGNANCY IN THIRD TRIMESTER: ICD-10-CM

## 2024-10-29 DIAGNOSIS — O10.012 PRE-EXISTING ESSENTIAL HYPERTENSION COMPLICATING PREGNANCY IN SECOND TRIMESTER: ICD-10-CM

## 2024-10-29 DIAGNOSIS — O99.810 PREDIABETES IN MOTHER DURING PREGNANCY: ICD-10-CM

## 2024-10-29 DIAGNOSIS — O34.10 LEIOMYOMA IN PREGNANCY, UTERINE, ANTEPARTUM: ICD-10-CM

## 2024-10-29 DIAGNOSIS — O99.320 MARIJUANA USE DURING PREGNANCY: Primary | ICD-10-CM

## 2024-10-29 DIAGNOSIS — O09.293 HISTORY OF PRETERM PREMATURE RUPTURE OF MEMBRANES (PROM) IN PREVIOUS PREGNANCY, CURRENTLY PREGNANT IN THIRD TRIMESTER: ICD-10-CM

## 2024-10-29 DIAGNOSIS — O09.299 HX OF PREECLAMPSIA, PRIOR PREGNANCY, CURRENTLY PREGNANT: ICD-10-CM

## 2024-10-29 DIAGNOSIS — F12.90 MARIJUANA USE DURING PREGNANCY: Primary | ICD-10-CM

## 2024-10-29 DIAGNOSIS — Z14.8 CARRIER OF SPINAL MUSCULAR ATROPHY: ICD-10-CM

## 2024-10-29 DIAGNOSIS — D25.9 LEIOMYOMA IN PREGNANCY, UTERINE, ANTEPARTUM: ICD-10-CM

## 2024-10-29 LAB — GLUCOSE SERPL-MCNC: 107 MG/DL (ref 70–110)

## 2024-10-29 PROCEDURE — 1159F MED LIST DOCD IN RCRD: CPT | Mod: CPTII,S$GLB,, | Performed by: OBSTETRICS & GYNECOLOGY

## 2024-10-29 PROCEDURE — 3008F BODY MASS INDEX DOCD: CPT | Mod: CPTII,S$GLB,, | Performed by: OBSTETRICS & GYNECOLOGY

## 2024-10-29 PROCEDURE — 3078F DIAST BP <80 MM HG: CPT | Mod: CPTII,S$GLB,, | Performed by: OBSTETRICS & GYNECOLOGY

## 2024-10-29 PROCEDURE — 99214 OFFICE O/P EST MOD 30 MIN: CPT | Mod: TH,S$GLB,, | Performed by: OBSTETRICS & GYNECOLOGY

## 2024-10-29 PROCEDURE — 1160F RVW MEDS BY RX/DR IN RCRD: CPT | Mod: CPTII,S$GLB,, | Performed by: OBSTETRICS & GYNECOLOGY

## 2024-10-29 PROCEDURE — 76816 OB US FOLLOW-UP PER FETUS: CPT | Mod: S$GLB,,, | Performed by: OBSTETRICS & GYNECOLOGY

## 2024-10-29 PROCEDURE — 3074F SYST BP LT 130 MM HG: CPT | Mod: CPTII,S$GLB,, | Performed by: OBSTETRICS & GYNECOLOGY

## 2024-10-29 PROCEDURE — 82962 GLUCOSE BLOOD TEST: CPT | Mod: ,,, | Performed by: OBSTETRICS & GYNECOLOGY

## 2024-11-01 DIAGNOSIS — Z86.32 HISTORY OF GESTATIONAL DIABETES MELLITUS (GDM) IN PRIOR PREGNANCY, CURRENTLY PREGNANT: ICD-10-CM

## 2024-11-01 DIAGNOSIS — O09.299 HISTORY OF GESTATIONAL DIABETES MELLITUS (GDM) IN PRIOR PREGNANCY, CURRENTLY PREGNANT: ICD-10-CM

## 2024-11-01 DIAGNOSIS — O10.012 PRE-EXISTING ESSENTIAL HYPERTENSION COMPLICATING PREGNANCY IN SECOND TRIMESTER: Primary | ICD-10-CM

## 2024-11-01 DIAGNOSIS — O99.320 MARIJUANA USE DURING PREGNANCY: ICD-10-CM

## 2024-11-01 DIAGNOSIS — F12.90 MARIJUANA USE DURING PREGNANCY: ICD-10-CM

## 2024-11-14 NOTE — PROGRESS NOTES
Maternal Fetal Medicine Follow Up     Subjective:     Patient ID: [id number]    Chief Complaint: m followup w/us      HPI: Cornelio Green is a 34 y.o. female  at 30w4d gestation with Estimated Date of Delivery: 25  who is here for follow-up consultation by M.    She is of advanced maternal age, 34 by the DELFINO.  She had negative cfDNA and declined amniocentesis.  She has history of 2 previous  deliveries.  Her 1st  delivery was at 35 weeks due to PPROM.  Her 2nd  delivery was at 32 weeks due to  labor. She was on [place] for her last pregnancy and delivered at term.  She has elected to use vaginal progesterone this pregnancy.  She had cervical incompetence ruled out this pregnancy.  Her last pregnancy was also complicated by gestational hypertension and GDM.  She now has prediabetes with hemoglobin A1c 6.3%, fasting glucose 128 earlier this pregnancy.  She is currently on insulin, 40 units of NPH at bedtime. She has corrective formula of 1 unit of Humalog for every 8 mg over 140.  Follow up hemoglobin A1c 6.2% on 10/29/2024.  She has CHTN, diagnosed in her last  pregnancy. She is currently on no antihypertensives. She had carrier screening that returned positive for SMA carrier.  She has declined any additional testing during the pregnancy.  Her BMI was 28.43 at initial [facility] visit. She has known fibroid uterus. She is on low dose aspirin twice daily.        Interval history since last M visit: None.. She denies any leaking fluid, vaginal bleeding, contractions, decreased fetal movement. Denies headaches, visual disturbances, or epigastric pain.    Pregnancy complications include:   Patient Active Problem List   Diagnosis    Multigravida of advanced maternal age in third trimester    (BMI 25.0-29.9)    At high risk for complications of intrauterine pregnancy (IUP)    Marijuana use during pregnancy    History of  premature rupture of membranes (PROM) in previous  "pregnancy, currently pregnant in third trimester    Hx of preeclampsia, prior pregnancy, currently pregnant    History of gestational diabetes mellitus (GDM) in prior pregnancy, currently pregnant    Carrier of spinal muscular atrophy    Pre-existing essential hypertension affecting pregnancy in third trimester    Leiomyoma in pregnancy, uterine, antepartum    Prediabetes in mother during pregnancy       No changes to medical, surgical, family, social, or obstetric history.    Medications:  Current Outpatient Medications   Medication Instructions    aspirin (ECOTRIN) 81 mg, Oral, 2 times daily, Take twice a day until 34 weeks 6 days, then decrease to once daily until delivery    insulin NPH (HUMULIN N NPH U-100 INSULIN) 100 unit/mL injection INJECT 14 UNITS AT BEDTIME    insulin syringe-needle U-100 1 mL 31 gauge x 5/16 Syrg Use 1 syringe as directed to inject insulin three times daily    ONETOUCH DELICA PLUS LANCET 30 gauge Misc 1 lancet , skin prick, 4 times daily    ONETOUCH ULTRA TEST Strp     prenatal vit37-iron-folic acid 29 mg iron- 1 mg Chew Take by mouth.    progesterone (PROMETRIUM) 200 mg, Vaginal, Nightly       Review of Systems   12 point review of systems conducted, negative except as stated in the history of present illness. See HPI for details.      Objective:     Visit Vitals  /71 (BP Location: Left arm, Patient Position: Sitting)   Pulse 86   Ht 5' 8" (1.727 m)   Wt 92.5 kg (203 lb 14.4 oz)   LMP  (LMP Unknown)   BMI 31.00 kg/m²        Physical Exam  Vitals and nursing note reviewed.   Constitutional:       Appearance: Normal appearance.      Comments: Increased BMI   HENT:      Head: Normocephalic and atraumatic.      Nose: Nose normal. No congestion.   Cardiovascular:      Rate and Rhythm: Normal rate.   Pulmonary:      Effort: Pulmonary effort is normal.   Skin:     Findings: No rash.   Neurological:      Mental Status: She is alert and oriented to person, place, and time.   Psychiatric:   "       Mood and Affect: Mood normal.         Behavior: Behavior normal.         Thought Content: Thought content normal.         Judgment: Judgment normal.         Assessment/Plan:     34 y.o.  female with IUP at 30w4d    Advanced maternal age at 34  There was normal fetal growth with an EFW of 1305 g at the 55% and the AC at the 85% on 10/29/24.  AFV is normal. BPP is 8 today (11/15/2024).     Reviewed risks with AMA, including risks for PTL, FGR, anomalies not seen, aneuploidy and stillbirth at term. She previously declined amniocentesis  and had negative cell free DNA.  She is aware of need for  evaluation.     Fetal surveillance as below.  Delivery timing as below.      Previous  delivery x2  She is aware of the risk of recurrence of  delivery.  She had cervical length surveillance, which ruled out cervical incompetence.  She will continue at the vaginal progesterone until 36 weeks 6 days.  PTL precautions reviewed.      Prediabetes vs T2DM   Risks associated with diabetes in pregnancy include higher risk for polyhydramnios, fetal macrosomia and  metabolic complications (hypoglycemia, hyperbilirubinemia, hypocalcemia, erythema).     Readdress the options of doing a fetal echocardiogram with the ambiguity about whether she had type 2 diabetes and agreed to refer for an echocardiogram    Continue 2200 calorie ADA diet.     Log reviewed.  Fasting blood sugars are elevated up to 112.  She was advised to adjusted dose of insulin; to 50 units of NPH at bedtime. Continue corrective dose of Humalog of 1 unit for every 8 mg of sugar over 140.    She was advised to continue to check the glucose twice a day, fasting daily in alternate postprandial checks, and bring log to each appointment.     Low dose aspirin as discussed.    Recommend weekly fetal testing at this time, increase to twice weekly fetal testing around 32 weeks.  Reviewed fetal kick count instructions.      Chronic  hypertension  Chronic hypertension is associated with significant adverse pregnancy outcomes including  birth, fetal growth restriction, fetal demise, placental abruption, and  delivery. Based on findings of recent CHAP Study, it is recommended to utilize 140/90 as the threshold for initiation or titration of medical therapy for chronic hypertension in pregnancy, rather than the previously recommended threshold of 160/110. For patients on blood pressure medications at the start of pregnancy, in the absence of mitigating factors or side effects, they can be maintained on their medications, rather than discontinuing them and waiting to initiate treatment for blood pressures in the severe range.    BP Readings from Last 1 Encounters:   11/15/24 120/71   With this BP, she was advised to continue low salt diet, and hold off on antihypertensive medication at this time.     Low dose aspirin as discussed.    Fetal surveillance as above.    Among patients with uncomplicated chronic hypertension with no additional risk factors, delivery at 38-39 weeks appears to provide optimal balance between the risk of adverse fetal and  outcomes.However, with multiple risk factors, will reassess closer to EDC to determine optimal timeframe or GA for delivery, based on current evaluation at that time.          SMA carrier   Previously discussed with her the inheritance pattern of SMA.  She has decided not to have any additional evaluation during the pregnancy.  She is aware of the need for  evaluation.      BMI 28.4 at initial OB visit  Body mass index is 31 kg/m². With reasonable weight gain since last visit, she was advised to follow a healthy, diabetic diet to avoid any excessive weight gain..  Excess weight gain would be associated with worsening hypertension, worsening diabetes and adverse  outcomes, including fetal demise in utero.    Reviewed importance of FKC 3/day and prn with instructions  to immediately report any decreased fetal movement.    It is important to lose weight after the pregnancy is over, especially before a future pregnancy. Breastfeeding may be an important tool in reducing the postpartum weight retention. Fetal risks were discussed with short term risk of fetal/ obesity and long term risk of adolescent component of metabolic syndrome.      At risk for preeclampsia  With her increased risk for preeclampsia, she agreed to continue asa 81 mg BID until 34 6/7 weeks, then decrease to once daily until delivery. Preeclampsia precautions reviewed.       Leiomyoma in pregnancy  Largest mean 2 cm on 10/4/24, stable.     Risks of fibroids include  delivery,  PROM, abnormal presentations, higher risk of  sections, placenta previa, fibroid degeneration, poor fetal growth, oligohydramnios, vaginal bleeding and postpartum hemorrhage and in rare circumstances obstruction of lower uterus/cervix, impeding vaginal delivery    We will plan to recheck fetal growth as above. If concern for degenerating fibroids, can administer short course of NSAID (48-72hrs of Indocin) prior to 32 weeks gestation.     Reviewed instructions to report any increased pain, cramps, vaginal discharge or bleeding. Emphasized the importance of monitoring fetal kick count activity, and reporting immediately if decreased fetal movement occurs.    Follow up in about 1 week (around 2024) for MFM follow-up, BPP.     No future appointments.      SARAH involvement: Patient was evaluated and examined by Dr. Wilson. BRIAN Onofre, helped in pre charting of part of note.    This note was created with the assistance of [employer] voice recognition software. There may be transcription errors as a result of using this technology, however minimal. Effort has been made to ensure accuracy of transcription, but any obvious errors or omissions should be clarified with the author of the document.

## 2024-11-15 ENCOUNTER — PROCEDURE VISIT (OUTPATIENT)
Dept: MATERNAL FETAL MEDICINE | Facility: CLINIC | Age: 34
End: 2024-11-15
Payer: MEDICAID

## 2024-11-15 ENCOUNTER — OFFICE VISIT (OUTPATIENT)
Dept: MATERNAL FETAL MEDICINE | Facility: CLINIC | Age: 34
End: 2024-11-15
Payer: MEDICAID

## 2024-11-15 VITALS
WEIGHT: 203.88 LBS | BODY MASS INDEX: 30.9 KG/M2 | HEART RATE: 86 BPM | SYSTOLIC BLOOD PRESSURE: 120 MMHG | DIASTOLIC BLOOD PRESSURE: 71 MMHG | HEIGHT: 68 IN

## 2024-11-15 DIAGNOSIS — O09.299 HISTORY OF GESTATIONAL DIABETES MELLITUS (GDM) IN PRIOR PREGNANCY, CURRENTLY PREGNANT: ICD-10-CM

## 2024-11-15 DIAGNOSIS — D25.9 LEIOMYOMA IN PREGNANCY, UTERINE, ANTEPARTUM: ICD-10-CM

## 2024-11-15 DIAGNOSIS — O10.012 PRE-EXISTING ESSENTIAL HYPERTENSION COMPLICATING PREGNANCY IN SECOND TRIMESTER: ICD-10-CM

## 2024-11-15 DIAGNOSIS — O09.523 MULTIGRAVIDA OF ADVANCED MATERNAL AGE IN THIRD TRIMESTER: ICD-10-CM

## 2024-11-15 DIAGNOSIS — Z86.32 HISTORY OF GESTATIONAL DIABETES MELLITUS (GDM) IN PRIOR PREGNANCY, CURRENTLY PREGNANT: ICD-10-CM

## 2024-11-15 DIAGNOSIS — O99.810 PREDIABETES IN MOTHER DURING PREGNANCY: ICD-10-CM

## 2024-11-15 DIAGNOSIS — O09.299 HX OF PREECLAMPSIA, PRIOR PREGNANCY, CURRENTLY PREGNANT: ICD-10-CM

## 2024-11-15 DIAGNOSIS — O09.293 HISTORY OF PRETERM PREMATURE RUPTURE OF MEMBRANES (PROM) IN PREVIOUS PREGNANCY, CURRENTLY PREGNANT IN THIRD TRIMESTER: ICD-10-CM

## 2024-11-15 DIAGNOSIS — F12.90 MARIJUANA USE DURING PREGNANCY: Primary | ICD-10-CM

## 2024-11-15 DIAGNOSIS — E66.3 OVERWEIGHT (BMI 25.0-29.9): ICD-10-CM

## 2024-11-15 DIAGNOSIS — F12.90 MARIJUANA USE DURING PREGNANCY: ICD-10-CM

## 2024-11-15 DIAGNOSIS — O99.320 MARIJUANA USE DURING PREGNANCY: Primary | ICD-10-CM

## 2024-11-15 DIAGNOSIS — O34.10 LEIOMYOMA IN PREGNANCY, UTERINE, ANTEPARTUM: ICD-10-CM

## 2024-11-15 DIAGNOSIS — Z14.8 CARRIER OF SPINAL MUSCULAR ATROPHY: ICD-10-CM

## 2024-11-15 DIAGNOSIS — O99.320 MARIJUANA USE DURING PREGNANCY: ICD-10-CM

## 2024-11-15 DIAGNOSIS — O10.013 PRE-EXISTING ESSENTIAL HYPERTENSION AFFECTING PREGNANCY IN THIRD TRIMESTER: ICD-10-CM

## 2024-11-15 LAB — GLUCOSE SERPL-MCNC: 138 MG/DL (ref 70–110)

## 2024-11-15 RX ORDER — BLOOD SUGAR DIAGNOSTIC
STRIP MISCELLANEOUS
COMMUNITY
Start: 2024-11-01

## 2024-11-21 NOTE — PROGRESS NOTES
Maternal Fetal Medicine Follow Up     Subjective:     Patient ID: [id number]    Chief Complaint: [facility] follow up w/us       HPI: Cornelio Green is a 34 y.o. female  at 31w4d gestation with Estimated Date of Delivery: 25  who is here for follow-up consultation by M.    She is of advanced maternal age, 34 by the DELFINO.  She had negative cfDNA and declined amniocentesis.  She has history of 2 previous  deliveries.  Her 1st  delivery was at 35 weeks due to PPROM.  Her 2nd  delivery was at 32 weeks due to  labor. She was on [place] for her last pregnancy and delivered at term.  She has elected to use vaginal progesterone this pregnancy.  She had cervical incompetence ruled out this pregnancy.  Her last pregnancy was also complicated by gestational hypertension and GDM.  She now has prediabetes with hemoglobin A1c 6.3%, fasting glucose 128 earlier this pregnancy.  She is currently on insulin, 50 units of NPH at bedtime. She has corrective formula of 1 unit of Humalog for every 8 mg over 140.  Follow up hemoglobin A1c 6.2% on 10/29/2024.  She has CHTN, diagnosed in her last  pregnancy. She is currently on no antihypertensives. She had carrier screening that returned positive for SMA carrier.  She has declined any additional testing during the pregnancy.  Her BMI was 28.43 at initial [facility] visit. She has known fibroid uterus. She is on low dose aspirin twice daily.        Interval history since last M visit: None.. She denies any leaking fluid, vaginal bleeding, contractions, decreased fetal movement. Denies headaches, visual disturbances, or epigastric pain.    Pregnancy complications include:   Patient Active Problem List   Diagnosis    Multigravida of advanced maternal age in third trimester    (BMI 25.0-29.9)    At high risk for complications of intrauterine pregnancy (IUP)    Marijuana use during pregnancy    History of  premature rupture of membranes (PROM) in previous  "pregnancy, currently pregnant in third trimester    Hx of preeclampsia, prior pregnancy, currently pregnant    History of gestational diabetes mellitus (GDM) in prior pregnancy, currently pregnant    Carrier of spinal muscular atrophy    Pre-existing essential hypertension affecting pregnancy in third trimester    Leiomyoma in pregnancy, uterine, antepartum    Prediabetes in mother during pregnancy       No changes to medical, surgical, family, social, or obstetric history.    Medications:  Current Outpatient Medications   Medication Instructions    aspirin (ECOTRIN) 81 mg, Oral, 2 times daily, Take twice a day until 34 weeks 6 days, then decrease to once daily until delivery    insulin NPH (HUMULIN N NPH U-100 INSULIN) 100 unit/mL injection INJECT 14 UNITS AT BEDTIME    insulin syringe-needle U-100 1 mL 31 gauge x 5/16 Syrg Use 1 syringe as directed to inject insulin three times daily    ONETOUCH DELICA PLUS LANCET 30 gauge Misc 1 lancet , skin prick, 4 times daily    ONETOUCH ULTRA TEST Strp     prenatal vit37-iron-folic acid 29 mg iron- 1 mg Chew Take by mouth.    progesterone (PROMETRIUM) 200 mg, Vaginal, Nightly       Review of Systems   12 point review of systems conducted, negative except as stated in the history of present illness. See HPI for details.      Objective:     Visit Vitals  /78 (BP Location: Left arm, Patient Position: Sitting)   Pulse 88   Ht 5' 8" (1.727 m)   Wt 91.6 kg (202 lb)   LMP  (LMP Unknown)   BMI 30.71 kg/m²        Physical Exam  Vitals and nursing note reviewed.   Constitutional:       Appearance: Normal appearance.      Comments: Increased BMI   HENT:      Head: Normocephalic and atraumatic.      Nose: Nose normal. No congestion.   Cardiovascular:      Rate and Rhythm: Normal rate.   Pulmonary:      Effort: Pulmonary effort is normal.   Skin:     Findings: No rash.   Neurological:      Mental Status: She is alert and oriented to person, place, and time.   Psychiatric:         " Mood and Affect: Mood normal.         Behavior: Behavior normal.         Thought Content: Thought content normal.         Judgment: Judgment normal.         Assessment/Plan:     34 y.o.  female with IUP at 31w4d    Advanced maternal age at 34  There was normal fetal growth with an EFW of 1305 g at the 55% and the AC at the 85% on 10/29/24.  AFV is normal. BPP is 8 today (2024).     Reviewed risks with AMA, including risks for PTL, FGR, anomalies not seen, aneuploidy and stillbirth at term. She previously declined amniocentesis  and had negative cell free DNA.  She is aware of need for  evaluation.     Fetal surveillance as below.  Delivery timing as below.      Previous  delivery x2  She is aware of the risk of recurrence of  delivery.  She had cervical length surveillance, which ruled out cervical incompetence.  She will continue at the vaginal progesterone until 36 weeks 6 days.  PTL precautions reviewed.      Prediabetes vs T2DM   Risks associated with diabetes in pregnancy include higher risk for polyhydramnios, fetal macrosomia and  metabolic complications (hypoglycemia, hyperbilirubinemia, hypocalcemia, erythema).     Previously discussed option of doing a fetal echocardiogram with the ambiguity about whether she had type 2 diabetes and agreed to refer for an echocardiogram.  She has appointment pending.    Continue 2200 calorie ADA diet.     Log reviewed.  Fasting blood sugar ranging between 90s to 111 with the only 1 reading at 84, all postprandial blood sugars are normal.  She was advised to adjusted dose of insulin to 62 units of NPH at bedtime. Continue corrective dose of Humalog of 1 unit for every 8 mg of sugar over 140.    She was advised to continue to check the glucose twice a day, fasting daily in alternate postprandial checks, and bring log to each appointment.     Low dose aspirin as discussed.    Recommend weekly fetal testing at this time, increase to  twice weekly fetal testing around 32 weeks.  Reviewed fetal kick count instructions.      Chronic hypertension  Chronic hypertension is associated with significant adverse pregnancy outcomes including  birth, fetal growth restriction, fetal demise, placental abruption, and  delivery. Based on findings of recent CHAP Study, it is recommended to utilize 140/90 as the threshold for initiation or titration of medical therapy for chronic hypertension in pregnancy, rather than the previously recommended threshold of 160/110. For patients on blood pressure medications at the start of pregnancy, in the absence of mitigating factors or side effects, they can be maintained on their medications, rather than discontinuing them and waiting to initiate treatment for blood pressures in the severe range.    BP Readings from Last 1 Encounters:   24 129/78   With this BP, she was advised to continue low salt diet, and hold off on antihypertensive medication at this time.     Low dose aspirin as discussed.    Fetal surveillance as above.    Among patients with uncomplicated chronic hypertension with no additional risk factors, delivery at 38-39 weeks appears to provide optimal balance between the risk of adverse fetal and  outcomes.However, with multiple risk factors, will reassess closer to EDC to determine optimal timeframe or GA for delivery, based on current evaluation at that time.          SMA carrier   Previously discussed with her the inheritance pattern of SMA.  She has decided not to have any additional evaluation during the pregnancy.  She is aware of the need for  evaluation.      BMI 28.4 at initial OB visit  Body mass index is 30.71 kg/m². With reasonable weight gain since last visit, she was advised to follow a healthy, diabetic diet to avoid any excessive weight gain..  Excess weight gain would be associated with worsening hypertension, worsening diabetes and adverse   outcomes, including fetal demise in utero.    Reviewed importance of FKC 3/day and prn with instructions to immediately report any decreased fetal movement.    It is important to lose weight after the pregnancy is over, especially before a future pregnancy. Breastfeeding may be an important tool in reducing the postpartum weight retention. Fetal risks were discussed with short term risk of fetal/ obesity and long term risk of adolescent component of metabolic syndrome.      At risk for preeclampsia  With her increased risk for preeclampsia, she agreed to continue asa 81 mg BID until 34 6/7 weeks, then decrease to once daily until delivery. Preeclampsia precautions reviewed.       Leiomyoma in pregnancy  Largest mean 2 cm on 10/4/24, stable.     Risks of fibroids include  delivery,  PROM, abnormal presentations, higher risk of  sections, placenta previa, fibroid degeneration, poor fetal growth, oligohydramnios, vaginal bleeding and postpartum hemorrhage and in rare circumstances obstruction of lower uterus/cervix, impeding vaginal delivery    We will plan to recheck fetal growth as above. If concern for degenerating fibroids, can administer short course of NSAID (48-72hrs of Indocin) prior to 32 weeks gestation.     Reviewed instructions to report any increased pain, cramps, vaginal discharge or bleeding. Emphasized the importance of monitoring fetal kick count activity, and reporting immediately if decreased fetal movement occurs.    I adjusted dose of insulin as above.  Send a prescription for strips that she needs for glucometer monitoring.  Patient will have NST later Friday in the hospital with Dr. Bruce and she will see Dr. Bruce early in the week.  We will see her back in 2 weeks.    Follow up in about 2 weeks (around 2024) for MFM follow-up, BPP, Repeat  ultrasound.     No future appointments.    SARAH involvement: Patient was evaluated and examined by Dr. Wilson. Shazia  ALEJANDRO Norman, helped in pre charting of part of note.    This note was created with the assistance of [employer] voice recognition software. There may be transcription errors as a result of using this technology, however minimal. Effort has been made to ensure accuracy of transcription, but any obvious errors or omissions should be clarified with the author of the document.

## 2024-11-22 ENCOUNTER — PROCEDURE VISIT (OUTPATIENT)
Dept: MATERNAL FETAL MEDICINE | Facility: CLINIC | Age: 34
End: 2024-11-22
Payer: MEDICAID

## 2024-11-22 ENCOUNTER — TELEPHONE (OUTPATIENT)
Dept: MATERNAL FETAL MEDICINE | Facility: CLINIC | Age: 34
End: 2024-11-22

## 2024-11-22 ENCOUNTER — OFFICE VISIT (OUTPATIENT)
Dept: MATERNAL FETAL MEDICINE | Facility: CLINIC | Age: 34
End: 2024-11-22
Payer: MEDICAID

## 2024-11-22 VITALS
BODY MASS INDEX: 30.62 KG/M2 | SYSTOLIC BLOOD PRESSURE: 129 MMHG | DIASTOLIC BLOOD PRESSURE: 78 MMHG | WEIGHT: 202 LBS | HEART RATE: 88 BPM | HEIGHT: 68 IN

## 2024-11-22 DIAGNOSIS — O99.320 MARIJUANA USE DURING PREGNANCY: ICD-10-CM

## 2024-11-22 DIAGNOSIS — O34.10 LEIOMYOMA IN PREGNANCY, UTERINE, ANTEPARTUM: ICD-10-CM

## 2024-11-22 DIAGNOSIS — Z86.32 HISTORY OF GESTATIONAL DIABETES MELLITUS (GDM) IN PRIOR PREGNANCY, CURRENTLY PREGNANT: ICD-10-CM

## 2024-11-22 DIAGNOSIS — O09.299 HX OF PREECLAMPSIA, PRIOR PREGNANCY, CURRENTLY PREGNANT: ICD-10-CM

## 2024-11-22 DIAGNOSIS — O09.523 MULTIGRAVIDA OF ADVANCED MATERNAL AGE IN THIRD TRIMESTER: ICD-10-CM

## 2024-11-22 DIAGNOSIS — E66.3 OVERWEIGHT (BMI 25.0-29.9): ICD-10-CM

## 2024-11-22 DIAGNOSIS — D25.9 LEIOMYOMA IN PREGNANCY, UTERINE, ANTEPARTUM: ICD-10-CM

## 2024-11-22 DIAGNOSIS — O09.293 HISTORY OF PRETERM PREMATURE RUPTURE OF MEMBRANES (PROM) IN PREVIOUS PREGNANCY, CURRENTLY PREGNANT IN THIRD TRIMESTER: ICD-10-CM

## 2024-11-22 DIAGNOSIS — O99.810 PREDIABETES IN MOTHER DURING PREGNANCY: Primary | ICD-10-CM

## 2024-11-22 DIAGNOSIS — O09.299 HISTORY OF GESTATIONAL DIABETES MELLITUS (GDM) IN PRIOR PREGNANCY, CURRENTLY PREGNANT: ICD-10-CM

## 2024-11-22 DIAGNOSIS — O10.013 PRE-EXISTING ESSENTIAL HYPERTENSION AFFECTING PREGNANCY IN THIRD TRIMESTER: ICD-10-CM

## 2024-11-22 DIAGNOSIS — O10.013 PRE-EXISTING ESSENTIAL HYPERTENSION AFFECTING PREGNANCY IN THIRD TRIMESTER: Primary | ICD-10-CM

## 2024-11-22 DIAGNOSIS — O99.810 PREDIABETES IN MOTHER DURING PREGNANCY: ICD-10-CM

## 2024-11-22 DIAGNOSIS — Z14.8 CARRIER OF SPINAL MUSCULAR ATROPHY: ICD-10-CM

## 2024-11-22 DIAGNOSIS — F12.90 MARIJUANA USE DURING PREGNANCY: ICD-10-CM

## 2024-11-22 LAB — GLUCOSE SERPL-MCNC: 100 MG/DL (ref 70–110)

## 2024-11-22 RX ORDER — BLOOD SUGAR DIAGNOSTIC
STRIP MISCELLANEOUS
Qty: 100 EACH | Refills: 2 | Status: SHIPPED | OUTPATIENT
Start: 2024-11-22

## 2024-11-25 DIAGNOSIS — Z86.32 HISTORY OF GESTATIONAL DIABETES MELLITUS (GDM) IN PRIOR PREGNANCY, CURRENTLY PREGNANT: ICD-10-CM

## 2024-11-25 DIAGNOSIS — O10.013 PRE-EXISTING ESSENTIAL HYPERTENSION AFFECTING PREGNANCY IN THIRD TRIMESTER: Primary | ICD-10-CM

## 2024-11-25 DIAGNOSIS — O09.299 HISTORY OF GESTATIONAL DIABETES MELLITUS (GDM) IN PRIOR PREGNANCY, CURRENTLY PREGNANT: ICD-10-CM

## 2024-11-25 DIAGNOSIS — Z14.8 CARRIER OF SPINAL MUSCULAR ATROPHY: ICD-10-CM

## 2024-11-25 DIAGNOSIS — F12.90 MARIJUANA USE DURING PREGNANCY: ICD-10-CM

## 2024-11-25 DIAGNOSIS — D25.9 LEIOMYOMA IN PREGNANCY, UTERINE, ANTEPARTUM: ICD-10-CM

## 2024-11-25 DIAGNOSIS — O09.293 HISTORY OF PRETERM PREMATURE RUPTURE OF MEMBRANES (PROM) IN PREVIOUS PREGNANCY, CURRENTLY PREGNANT IN THIRD TRIMESTER: ICD-10-CM

## 2024-11-25 DIAGNOSIS — O24.414 INSULIN CONTROLLED GESTATIONAL DIABETES MELLITUS (GDM) IN SECOND TRIMESTER: ICD-10-CM

## 2024-11-25 DIAGNOSIS — O09.523 MULTIGRAVIDA OF ADVANCED MATERNAL AGE IN THIRD TRIMESTER: ICD-10-CM

## 2024-11-25 DIAGNOSIS — O34.10 LEIOMYOMA IN PREGNANCY, UTERINE, ANTEPARTUM: ICD-10-CM

## 2024-11-25 DIAGNOSIS — O99.320 MARIJUANA USE DURING PREGNANCY: ICD-10-CM

## 2024-12-12 NOTE — PROGRESS NOTES
Maternal Fetal Medicine Follow Up     Subjective:     Patient ID: [id number]    Chief Complaint: m followup w/us      HPI: Cornelio Green is a 34 y.o. female  at 34w4d gestation with Estimated Date of Delivery: 25  who is here for follow-up consultation by M.    She is of advanced maternal age, 34 by the DELFINO.  She had negative cfDNA and declined amniocentesis.  She has history of 2 previous  deliveries.  Her 1st  delivery was at 35 weeks due to PPROM.  Her 2nd  delivery was at 32 weeks due to  labor. She was on [place] for her last pregnancy and delivered at term.  She has elected to use vaginal progesterone this pregnancy and had cervical incompetence ruled out.  Her last pregnancy was also complicated by gestational hypertension and GDM.  She now has prediabetes with hemoglobin A1c 6.3%, fasting glucose 128 earlier this pregnancy.  She is currently on insulin, 62 units of NPH at bedtime. She has corrective formula of 1 unit of Humalog for every 8 mg over 140.  Follow up hemoglobin A1c 6.2% on 10/29/2024.  She has CHTN, diagnosed in her last  pregnancy. She is currently on no antihypertensives. She had carrier screening that returned positive for SMA carrier.  She has declined any additional testing during the pregnancy.  Her BMI was 28.43 at initial M visit. She has known fibroid uterus. She is on low dose aspirin twice daily.  She has been noncompliant with her prenatal care, missed last appointment.    She did not bring blood glucose log for review but reports fasting , 1 hour postprandial breakfast , 1 hour postprandial lunch around 110, 1 hour postprandial supper around 99.       Interval history since last M visit: None.. She denies any leaking fluid, vaginal bleeding, contractions, decreased fetal movement. Denies headaches, visual disturbances, or epigastric pain.    Pregnancy complications include:   Patient Active Problem List   Diagnosis     "Multigravida of advanced maternal age in third trimester    (BMI 25.0-29.9)    At high risk for complications of intrauterine pregnancy (IUP)    Marijuana use during pregnancy    History of  premature rupture of membranes (PROM) in previous pregnancy, currently pregnant in third trimester    Hx of preeclampsia, prior pregnancy, currently pregnant    History of gestational diabetes mellitus (GDM) in prior pregnancy, currently pregnant    Carrier of spinal muscular atrophy    Pre-existing essential hypertension affecting pregnancy in third trimester    Leiomyoma in pregnancy, uterine, antepartum    Prediabetes in mother during pregnancy    Trend for excessive fetal growth affecting management of pregnancy in third trimester       No changes to medical, surgical, family, social, or obstetric history.    Medications:  Current Outpatient Medications   Medication Instructions    aspirin (ECOTRIN) 81 mg, Oral, 2 times daily, Take twice a day until 34 weeks 6 days, then decrease to once daily until delivery    FIRST-PROGESTERONE  VAGL Place vaginally.    insulin NPH (HUMULIN N NPH U-100 INSULIN) 100 unit/mL injection INJECT 14 UNITS AT BEDTIME    insulin syringe-needle U-100 1 mL 31 gauge x 5/16 Syrg Use 1 syringe as directed to inject insulin three times daily    nitrofurantoin, macrocrystal-monohydrate, (MACROBID) 100 MG capsule 100 mg, 2 times daily    ONETOUCH DELICA PLUS LANCET 30 gauge Misc 1 lancet , skin prick, 4 times daily    ONETOUCH ULTRA TEST Strp Use as directed to check glucose 4 times a day    prenatal vit37-iron-folic acid 29 mg iron- 1 mg Chew Take by mouth.    progesterone (PROMETRIUM) 200 mg, Vaginal, Nightly       Review of Systems   12 point review of systems conducted, negative except as stated in the history of present illness. See HPI for details.      Objective:     Visit Vitals  /72 (BP Location: Left arm, Patient Position: Sitting)   Pulse 91   Ht 5' 8" (1.727 m)   Wt 92.6 kg " (204 lb 1.6 oz)   LMP  (LMP Unknown)   BMI 31.03 kg/m²        Physical Exam  Vitals and nursing note reviewed.   Constitutional:       Appearance: Normal appearance.      Comments: Increased BMI   HENT:      Head: Normocephalic and atraumatic.      Nose: Nose normal. No congestion.   Cardiovascular:      Rate and Rhythm: Normal rate.   Pulmonary:      Effort: Pulmonary effort is normal.   Skin:     Findings: No rash.   Neurological:      Mental Status: She is alert and oriented to person, place, and time.   Psychiatric:         Mood and Affect: Mood normal.         Behavior: Behavior normal.         Thought Content: Thought content normal.         Judgment: Judgment normal.         Assessment/Plan:     34 y.o.  female with IUP at 34w4d    Advanced maternal age at 34  There is trend for accelerated fetal growth with an EFW of 2836 g at the 55% and the AC at the 97% on 24.  AFV is normal. BPP is 8/8 today (2024).     Reviewed risks with AMA, including risks for PTL, FGR, anomalies not seen, aneuploidy and stillbirth at term. She previously declined amniocentesis  and had negative cell free DNA.  She is aware of need for  evaluation.     Fetal surveillance as below.  Delivery timing as below.      Previous  delivery x2  She is aware of the risk of recurrence of  delivery.  She had cervical length surveillance, which ruled out cervical incompetence.  She will continue nightly vaginal progesterone until 36 weeks 6 days.  PTL precautions reviewed.      Prediabetes vs T2DM   Risks associated with diabetes in pregnancy include higher risk for polyhydramnios, fetal macrosomia and  metabolic complications (hypoglycemia, hyperbilirubinemia, hypocalcemia, erythema).     Previously discussed option of doing a fetal echocardiogram with the ambiguity about whether she had type 2 diabetes and agreed to refer for an echocardiogram.  She has appointment pending.    Continue 2200  calorie ADA diet.     No log for review.  Values discussed.  There is a discrepancy with the sugar in the office this afternoon that is elevated at 145 compared to reported normal sugars in the afternoon.  I empirically advised her to make adjustments to 16 units of NPH in the morning and 76 units of NPH at bedtime. Continue corrective dose of Humalog of 1 unit for every 8 mg of sugar over 140.    Advised the patient importance of bringing the sugar log with her as well as the glucometer machine to make sure that her numbers are matching our readings    She was advised to continue to check the glucose twice a day, fasting daily in alternate postprandial checks, and bring log to each appointment.  Urged to bring log to each appointment as log is necessary and making most optimal insulin adjustments.     Low dose aspirin as discussed.    Fetal surveillance as below.      Chronic hypertension  Chronic hypertension is associated with significant adverse pregnancy outcomes including  birth, fetal growth restriction, fetal demise, placental abruption, and  delivery. Based on findings of recent CHAP Study, it is recommended to utilize 140/90 as the threshold for initiation or titration of medical therapy for chronic hypertension in pregnancy, rather than the previously recommended threshold of 160/110. For patients on blood pressure medications at the start of pregnancy, in the absence of mitigating factors or side effects, they can be maintained on their medications, rather than discontinuing them and waiting to initiate treatment for blood pressures in the severe range.    BP Readings from Last 1 Encounters:   24 122/72   With this BP, she was advised to continue low salt diet, and hold off on antihypertensive medication at this time.     Low dose aspirin as discussed.    Recommend fetal testing twice weekly fetal testing, to alternate with primary OB, until the end of pregnancy.  Reviewed fetal kick  count instructions.    Among patients with uncomplicated chronic hypertension with no additional risk factors, delivery at 38-39 weeks appears to provide optimal balance between the risk of adverse fetal and  outcomes.However, with multiple risk factors, will reassess closer to EDC to determine optimal timeframe or GA for delivery, based on current evaluation at that time.          SMA carrier   Previously discussed with her the inheritance pattern of SMA.  She has decided not to have any additional evaluation during the pregnancy.  She is aware of the need for  evaluation.      BMI 28.4 at initial OB visit  Body mass index is 31.03 kg/m². With reasonable weight gain since last visit, she was advised to follow a healthy, diabetic diet to avoid any excessive weight gain..  Excess weight gain would be associated with worsening hypertension, worsening diabetes and adverse  outcomes, including fetal demise in utero.    Reviewed importance of FKC 3/day and prn with instructions to immediately report any decreased fetal movement.    It is important to lose weight after the pregnancy is over, especially before a future pregnancy. Breastfeeding may be an important tool in reducing the postpartum weight retention. Fetal risks were discussed with short term risk of fetal/ obesity and long term risk of adolescent component of metabolic syndrome.      At risk for preeclampsia  With her increased risk for preeclampsia, she agreed to continue asa 81 mg BID until 34 6/7 weeks, then decrease to once daily until delivery. Preeclampsia precautions reviewed.       Leiomyoma in pregnancy  Risks of fibroids include  delivery,  PROM, abnormal presentations, higher risk of  sections, placenta previa, fibroid degeneration, poor fetal growth, oligohydramnios, vaginal bleeding and postpartum hemorrhage and in rare circumstances obstruction of lower uterus/cervix, impeding vaginal  delivery    We will plan to recheck fetal growth as above.    Reviewed instructions to report any increased pain, cramps, vaginal discharge or bleeding. Emphasized the importance of monitoring fetal kick count activity, and reporting immediately if decreased fetal movement occurs.      Noncompliance  She was advised of the importance of continued care and follow-up as directed with each provider. Reviewed the risks of uncontrolled  diabetes and her additional comorbidities in pregnancy and the importance of continued care for her pregnancy complications as above, as well as the risk of FDIU. She verbalized understanding.       Follow up in about 1 week (around 12/20/2024) for MFM Followup, BPP.     Future Appointments   Date Time Provider Department Center   12/20/2024 10:00 AM Bradford Wilson MD [facility] [place]   12/20/2024 10:00 AM ROOM 3, [facility] [place]       SARAH involvement: Patient was evaluated and examined by Dr. Wilson. BRIAN Onofre, helped in pre charting of part of note.    This note was created with the assistance of [employer] voice recognition software. There may be transcription errors as a result of using this technology, however minimal. Effort has been made to ensure accuracy of transcription, but any obvious errors or omissions should be clarified with the author of the document.

## 2024-12-13 ENCOUNTER — OFFICE VISIT (OUTPATIENT)
Dept: MATERNAL FETAL MEDICINE | Facility: CLINIC | Age: 34
End: 2024-12-13
Payer: MEDICAID

## 2024-12-13 ENCOUNTER — PROCEDURE VISIT (OUTPATIENT)
Dept: MATERNAL FETAL MEDICINE | Facility: CLINIC | Age: 34
End: 2024-12-13
Payer: MEDICAID

## 2024-12-13 VITALS
HEART RATE: 91 BPM | DIASTOLIC BLOOD PRESSURE: 72 MMHG | SYSTOLIC BLOOD PRESSURE: 122 MMHG | HEIGHT: 68 IN | BODY MASS INDEX: 30.94 KG/M2 | WEIGHT: 204.13 LBS

## 2024-12-13 DIAGNOSIS — Z14.8 CARRIER OF SPINAL MUSCULAR ATROPHY: ICD-10-CM

## 2024-12-13 DIAGNOSIS — F12.90 MARIJUANA USE DURING PREGNANCY: ICD-10-CM

## 2024-12-13 DIAGNOSIS — D25.9 LEIOMYOMA IN PREGNANCY, UTERINE, ANTEPARTUM: ICD-10-CM

## 2024-12-13 DIAGNOSIS — O09.523 MULTIGRAVIDA OF ADVANCED MATERNAL AGE IN THIRD TRIMESTER: ICD-10-CM

## 2024-12-13 DIAGNOSIS — O09.299 HX OF PREECLAMPSIA, PRIOR PREGNANCY, CURRENTLY PREGNANT: ICD-10-CM

## 2024-12-13 DIAGNOSIS — O99.810 PREDIABETES IN MOTHER DURING PREGNANCY: ICD-10-CM

## 2024-12-13 DIAGNOSIS — O34.10 LEIOMYOMA IN PREGNANCY, UTERINE, ANTEPARTUM: ICD-10-CM

## 2024-12-13 DIAGNOSIS — O10.013 PRE-EXISTING ESSENTIAL HYPERTENSION AFFECTING PREGNANCY IN THIRD TRIMESTER: ICD-10-CM

## 2024-12-13 DIAGNOSIS — O09.299 HISTORY OF GESTATIONAL DIABETES MELLITUS (GDM) IN PRIOR PREGNANCY, CURRENTLY PREGNANT: ICD-10-CM

## 2024-12-13 DIAGNOSIS — Z86.32 HISTORY OF GESTATIONAL DIABETES MELLITUS (GDM) IN PRIOR PREGNANCY, CURRENTLY PREGNANT: ICD-10-CM

## 2024-12-13 DIAGNOSIS — F12.90 MARIJUANA USE DURING PREGNANCY: Primary | ICD-10-CM

## 2024-12-13 DIAGNOSIS — O99.320 MARIJUANA USE DURING PREGNANCY: Primary | ICD-10-CM

## 2024-12-13 DIAGNOSIS — O36.63X0 EXCESSIVE FETAL GROWTH AFFECTING MANAGEMENT OF PREGNANCY IN THIRD TRIMESTER, SINGLE OR UNSPECIFIED FETUS: ICD-10-CM

## 2024-12-13 DIAGNOSIS — O99.320 MARIJUANA USE DURING PREGNANCY: ICD-10-CM

## 2024-12-13 DIAGNOSIS — O24.414 INSULIN CONTROLLED GESTATIONAL DIABETES MELLITUS (GDM) IN SECOND TRIMESTER: ICD-10-CM

## 2024-12-13 DIAGNOSIS — O09.293 HISTORY OF PRETERM PREMATURE RUPTURE OF MEMBRANES (PROM) IN PREVIOUS PREGNANCY, CURRENTLY PREGNANT IN THIRD TRIMESTER: ICD-10-CM

## 2024-12-13 LAB — GLUCOSE SERPL-MCNC: 145 MG/DL (ref 70–110)

## 2024-12-13 RX ORDER — NITROFURANTOIN 25; 75 MG/1; MG/1
100 CAPSULE ORAL 2 TIMES DAILY
COMMUNITY
Start: 2024-12-10

## 2024-12-16 DIAGNOSIS — O09.299 HISTORY OF GESTATIONAL DIABETES MELLITUS (GDM) IN PRIOR PREGNANCY, CURRENTLY PREGNANT: ICD-10-CM

## 2024-12-16 DIAGNOSIS — O24.414 INSULIN CONTROLLED GESTATIONAL DIABETES MELLITUS (GDM) IN SECOND TRIMESTER: ICD-10-CM

## 2024-12-16 DIAGNOSIS — O09.293 HISTORY OF PRETERM PREMATURE RUPTURE OF MEMBRANES (PROM) IN PREVIOUS PREGNANCY, CURRENTLY PREGNANT IN THIRD TRIMESTER: ICD-10-CM

## 2024-12-16 DIAGNOSIS — O10.013 PRE-EXISTING ESSENTIAL HYPERTENSION AFFECTING PREGNANCY IN THIRD TRIMESTER: Primary | ICD-10-CM

## 2024-12-16 DIAGNOSIS — Z86.32 HISTORY OF GESTATIONAL DIABETES MELLITUS (GDM) IN PRIOR PREGNANCY, CURRENTLY PREGNANT: ICD-10-CM

## 2024-12-16 DIAGNOSIS — O09.523 MULTIGRAVIDA OF ADVANCED MATERNAL AGE IN THIRD TRIMESTER: ICD-10-CM

## 2024-12-19 NOTE — PROGRESS NOTES
Maternal Fetal Medicine Follow Up     Subjective:     Patient ID: [id number]    Chief Complaint: [facility] follow up w/us       HPI: Cornelio Green is a 34 y.o. female  at 35w4d gestation with Estimated Date of Delivery: 25  who is here for follow-up consultation by M.    She is of advanced maternal age, 34 by the DELFINO.  She had negative cfDNA and declined amniocentesis.  She has history of 2 previous  deliveries.  Her 1st  delivery was at 35 weeks due to PPROM.  Her 2nd  delivery was at 32 weeks due to  labor. She was on [place] for her last pregnancy and delivered at term.  She has elected to use vaginal progesterone this pregnancy and had cervical incompetence ruled out.  Her last pregnancy was also complicated by gestational hypertension and GDM.  She now has prediabetes with hemoglobin A1c 6.3%, fasting glucose 128 earlier this pregnancy.  She is currently supposed to be on insulin, 16 units NPH in the morning 76 units of NPH at bedtime. She has corrective formula of 1 unit of Humalog for every 8 mg over 140.  However, today 2024, she reports that yesterday she broke out in hives on her arms and stomach when she took her insulin.  She went to the ER and reports was told it was an allergy to a topical agent rather than the insulin.  However, she decided to not take insulin  this morning.  She reported her rash is resolved..  Follow up hemoglobin A1c 6.2% on 10/29/2024.  She has CHTN, diagnosed in her last  pregnancy. She is currently on no antihypertensives. She had carrier screening that returned positive for SMA carrier.  She has declined any additional testing during the pregnancy.  Her BMI was 28.43 at initial [facility] visit. She has known fibroid uterus. She is on low dose aspirin bid.  She has been intermittently noncompliant with her prenatal care, including visits and bringing glucose log.       Interval history since last M visit: None.. She denies any leaking  fluid, vaginal bleeding, contractions, decreased fetal movement. Denies headaches, visual disturbances, or epigastric pain.    Pregnancy complications include:   Patient Active Problem List   Diagnosis    Multigravida of advanced maternal age in third trimester    (BMI 25.0-29.9)    At high risk for complications of intrauterine pregnancy (IUP)    Marijuana use during pregnancy    History of  premature rupture of membranes (PROM) in previous pregnancy, currently pregnant in third trimester    Hx of preeclampsia, prior pregnancy, currently pregnant    History of gestational diabetes mellitus (GDM) in prior pregnancy, currently pregnant    Carrier of spinal muscular atrophy    Pre-existing essential hypertension affecting pregnancy in third trimester    Leiomyoma in pregnancy, uterine, antepartum    Prediabetes in mother during pregnancy    Trend for excessive fetal growth affecting management of pregnancy in third trimester       No changes to medical, surgical, family, social, or obstetric history.    Medications:  Current Outpatient Medications   Medication Instructions    aspirin (ECOTRIN) 81 mg, Oral, 2 times daily, Take twice a day until 34 weeks 6 days, then decrease to once daily until delivery    insulin NPH (HUMULIN N NPH U-100 INSULIN) 100 unit/mL injection INJECT 14 UNITS AT BEDTIME    insulin syringe-needle U-100 1 mL 31 gauge x 5/16 Syrg Use 1 syringe as directed to inject insulin three times daily    nitrofurantoin, macrocrystal-monohydrate, (MACROBID) 100 MG capsule 100 mg, 2 times daily    ONETOUCH DELICA PLUS LANCET 30 gauge Misc 1 lancet , skin prick, 4 times daily    ONETOUCH ULTRA TEST Strp Use as directed to check glucose 4 times a day    prenatal vit37-iron-folic acid 29 mg iron- 1 mg Chew Take by mouth.    progesterone (PROMETRIUM) 200 mg, Vaginal, Nightly       Review of Systems   12 point review of systems conducted, negative except as stated in the history of present illness. See HPI  "for details.      Objective:     Visit Vitals  /84 (BP Location: Left arm, Patient Position: Sitting)   Pulse 90   Ht 5' 8" (1.727 m)   Wt 93.4 kg (206 lb)   LMP  (LMP Unknown)   BMI 31.32 kg/m²        Physical Exam  Vitals and nursing note reviewed.   Constitutional:       Appearance: Normal appearance.      Comments: Increased BMI   HENT:      Head: Normocephalic and atraumatic.      Nose: Nose normal. No congestion.   Cardiovascular:      Rate and Rhythm: Normal rate.   Pulmonary:      Effort: Pulmonary effort is normal.   Skin:     Findings: No rash.   Neurological:      Mental Status: She is alert and oriented to person, place, and time.   Psychiatric:         Mood and Affect: Mood normal.         Behavior: Behavior normal.         Thought Content: Thought content normal.         Judgment: Judgment normal.         Assessment/Plan:     34 y.o.  female with IUP at 35w4d    Advanced maternal age at 34  Trend for accelerated fetal growth with an EFW of 2836 g at the 55% and the AC at the 97% on 24.  AFV is normal. BPP is 8/8 today (2024).     Reviewed risks with AMA, including risks for PTL, FGR, anomalies not seen, aneuploidy and stillbirth at term. She previously declined amniocentesis  and had negative cell free DNA.  She is aware of need for  evaluation.     Fetal surveillance as below.  Delivery timing as below.      Previous  delivery x2  She is aware of the risk of recurrence of  delivery.  She had cervical length surveillance, which ruled out cervical incompetence.  She will continue nightly vaginal progesterone until 36 weeks 6 days.  PTL precautions reviewed.      Prediabetes vs T2DM   Risks associated with diabetes in pregnancy include higher risk for polyhydramnios, fetal macrosomia and  metabolic complications (hypoglycemia, hyperbilirubinemia, hypocalcemia, erythema).     Previously discussed option of doing a fetal echocardiogram with the " ambiguity about whether she had type 2 diabetes and agreed to refer for an echocardiogram.  She has appointment pending.    Continue 2200 calorie ADA diet.       Reviewed with the patient that the rash is most likely not related the insulin and agreed to continue taking the insulin at this time.    Log reviewed.   With some elevated fasting blood sugars and low afternoon sugars with the  ACn over the 90th percentile, I adjusted dose of insulin to 12 units of NPH in the morning and 90 units of NPH at bedtime. Continue corrective dose of Humalog of 1 unit for every 8 mg of sugar over 140.    Advised the patient importance of bringing the sugar log with her as well as the glucometer machine to make sure that her numbers are matching our readings    She was advised to continue to check the glucose twice a day, fasting daily in alternate postprandial checks, and bring log to each appointment.  Urged to bring log to each appointment as log is necessary and making most optimal insulin adjustments.     Low dose aspirin as discussed.    Fetal surveillance as below.      Chronic hypertension  Chronic hypertension is associated with significant adverse pregnancy outcomes including  birth, fetal growth restriction, fetal demise, placental abruption, and  delivery. Based on findings of recent CHAP Study, it is recommended to utilize 140/90 as the threshold for initiation or titration of medical therapy for chronic hypertension in pregnancy, rather than the previously recommended threshold of 160/110. For patients on blood pressure medications at the start of pregnancy, in the absence of mitigating factors or side effects, they can be maintained on their medications, rather than discontinuing them and waiting to initiate treatment for blood pressures in the severe range.    BP Readings from Last 1 Encounters:   24 131/84   With this BP, she was advised to continue low salt diet, and hold off on  antihypertensive medication at this time.     Low dose aspirin as discussed.    Recommend fetal testing twice weekly fetal testing, to alternate with primary OB, until the end of pregnancy.  Reviewed fetal kick count instructions.    Among patients with uncomplicated chronic hypertension with no additional risk factors, delivery at 38-39 weeks appears to provide optimal balance between the risk of adverse fetal and  outcomes.However, with multiple risk factors, will reassess closer to EDC to determine optimal timeframe or GA for delivery, based on current evaluation at that time.          SMA carrier   Previously discussed with her the inheritance pattern of SMA.  She has decided not to have any additional evaluation during the pregnancy.  She is aware of the need for  evaluation.      BMI 28.4 at initial OB visit  Body mass index is 31.32 kg/m². With reasonable weight gain since last visit, she was advised to follow a healthy, diabetic diet to avoid any excessive weight gain..  Excess weight gain would be associated with worsening hypertension, worsening diabetes and adverse  outcomes, including fetal demise in utero.    Reviewed importance of FKC 3/day and prn with instructions to immediately report any decreased fetal movement.    It is important to lose weight after the pregnancy is over, especially before a future pregnancy. Breastfeeding may be an important tool in reducing the postpartum weight retention. Fetal risks were discussed with short term risk of fetal/ obesity and long term risk of adolescent component of metabolic syndrome.      At risk for preeclampsia  With her increased risk for preeclampsia, she agreed to adjust asa to 81 mg daily and continue daily until delivery. Preeclampsia precautions reviewed.       Leiomyoma in pregnancy  Risks of fibroids include  delivery,  PROM, abnormal presentations, higher risk of  sections, placenta previa,  fibroid degeneration, poor fetal growth, oligohydramnios, vaginal bleeding and postpartum hemorrhage and in rare circumstances obstruction of lower uterus/cervix, impeding vaginal delivery    We will plan to recheck fetal growth as above.    Reviewed instructions to report any increased pain, cramps, vaginal discharge or bleeding. Emphasized the importance of monitoring fetal kick count activity, and reporting immediately if decreased fetal movement occurs.      Noncompliance  She was advised of the importance of continued care and follow-up as directed with each provider. Reviewed the risks of uncontrolled  diabetes and her additional comorbidities in pregnancy and the importance of continued care for her pregnancy complications as above, as well as the risk of FDIU. She verbalized understanding.       Follow up in about 1 week (around 12/27/2024) for MFM follow-up, BPP.     Future Appointments   Date Time Provider Department Center   12/27/2024 10:00 AM Bradford Wilson MD [facility]   12/27/2024 10:00 AM ROOM 1, [facility] S [facility]     SARAH involvement: Patient was evaluated and examined by Dr. Wilson. ALEJANDRO Butler, helped in pre charting of part of note.    This note was created with the assistance of [employer] voice recognition software. There may be transcription errors as a result of using this technology, however minimal. Effort has been made to ensure accuracy of transcription, but any obvious errors or omissions should be clarified with the author of the document.

## 2024-12-20 ENCOUNTER — PROCEDURE VISIT (OUTPATIENT)
Dept: MATERNAL FETAL MEDICINE | Facility: CLINIC | Age: 34
End: 2024-12-20
Payer: MEDICAID

## 2024-12-20 ENCOUNTER — OFFICE VISIT (OUTPATIENT)
Dept: MATERNAL FETAL MEDICINE | Facility: CLINIC | Age: 34
End: 2024-12-20
Payer: MEDICAID

## 2024-12-20 VITALS
BODY MASS INDEX: 31.22 KG/M2 | HEIGHT: 68 IN | SYSTOLIC BLOOD PRESSURE: 131 MMHG | WEIGHT: 206 LBS | HEART RATE: 90 BPM | DIASTOLIC BLOOD PRESSURE: 84 MMHG

## 2024-12-20 DIAGNOSIS — Z86.32 HISTORY OF GESTATIONAL DIABETES MELLITUS (GDM) IN PRIOR PREGNANCY, CURRENTLY PREGNANT: ICD-10-CM

## 2024-12-20 DIAGNOSIS — O34.10 LEIOMYOMA IN PREGNANCY, UTERINE, ANTEPARTUM: ICD-10-CM

## 2024-12-20 DIAGNOSIS — O09.523 MULTIGRAVIDA OF ADVANCED MATERNAL AGE IN THIRD TRIMESTER: ICD-10-CM

## 2024-12-20 DIAGNOSIS — O09.299 HX OF PREECLAMPSIA, PRIOR PREGNANCY, CURRENTLY PREGNANT: ICD-10-CM

## 2024-12-20 DIAGNOSIS — D25.9 LEIOMYOMA IN PREGNANCY, UTERINE, ANTEPARTUM: ICD-10-CM

## 2024-12-20 DIAGNOSIS — O09.293 HISTORY OF PRETERM PREMATURE RUPTURE OF MEMBRANES (PROM) IN PREVIOUS PREGNANCY, CURRENTLY PREGNANT IN THIRD TRIMESTER: ICD-10-CM

## 2024-12-20 DIAGNOSIS — O24.414 INSULIN CONTROLLED GESTATIONAL DIABETES MELLITUS (GDM) IN SECOND TRIMESTER: ICD-10-CM

## 2024-12-20 DIAGNOSIS — O10.013 PRE-EXISTING ESSENTIAL HYPERTENSION AFFECTING PREGNANCY IN THIRD TRIMESTER: Primary | ICD-10-CM

## 2024-12-20 DIAGNOSIS — Z14.8 CARRIER OF SPINAL MUSCULAR ATROPHY: ICD-10-CM

## 2024-12-20 DIAGNOSIS — O99.810 PREDIABETES IN MOTHER DURING PREGNANCY: ICD-10-CM

## 2024-12-20 DIAGNOSIS — O10.013 PRE-EXISTING ESSENTIAL HYPERTENSION AFFECTING PREGNANCY IN THIRD TRIMESTER: ICD-10-CM

## 2024-12-20 DIAGNOSIS — O09.299 HISTORY OF GESTATIONAL DIABETES MELLITUS (GDM) IN PRIOR PREGNANCY, CURRENTLY PREGNANT: ICD-10-CM

## 2024-12-20 DIAGNOSIS — O36.63X0 EXCESSIVE FETAL GROWTH AFFECTING MANAGEMENT OF PREGNANCY IN THIRD TRIMESTER, SINGLE OR UNSPECIFIED FETUS: ICD-10-CM

## 2024-12-20 DIAGNOSIS — E66.3 OVERWEIGHT (BMI 25.0-29.9): ICD-10-CM

## 2024-12-20 LAB — GLUCOSE SERPL-MCNC: 85 MG/DL (ref 70–110)

## 2024-12-24 NOTE — PROGRESS NOTES
Maternal Fetal Medicine Follow Up     Subjective:     Patient ID: [id number]    Chief Complaint: [facility] follow up w/us       HPI: Cornelio Green is a 34 y.o. female  at 36w4d gestation with Estimated Date of Delivery: 25  who is here for follow-up consultation by M.    She is of advanced maternal age, 34 by the DELFINO.  She had negative cfDNA and declined amniocentesis.  She has history of 2 previous  deliveries.  Her 1st  delivery was at 35 weeks due to PPROM.  Her 2nd  delivery was at 32 weeks due to  labor. She was on [place] for her last pregnancy and delivered at term.  She has elected to use vaginal progesterone this pregnancy and had cervical incompetence ruled out.  Her last pregnancy was also complicated by gestational hypertension and GDM.  She now has prediabetes with hemoglobin A1c 6.3%, fasting glucose 128 earlier this pregnancy.  She is currently on insulin, 16 units NPH in the morning 90 units of NPH at bedtime. She has corrective formula of 1 unit of Humalog for every 8 mg over 140.  Follow up hemoglobin A1c 6.2% on 10/29/2024.  She has CHTN, diagnosed in her last / pregnancy. She is currently on no antihypertensives. She had carrier screening that returned positive for SMA carrier.  She has declined any additional testing during the pregnancy.  Her BMI was 28.43 at initial [facility] visit. She has known fibroid uterus. She is on low dose aspirin daily.  She has been intermittently noncompliant with her prenatal care, including visits and bringing glucose log.       Interval history since last M visit: None.. She denies any leaking fluid, vaginal bleeding, contractions, decreased fetal movement. Denies headaches, visual disturbances, or epigastric pain.    Pregnancy complications include:   Patient Active Problem List   Diagnosis    Multigravida of advanced maternal age in third trimester    (BMI 25.0-29.9)    At high risk for complications of intrauterine  "pregnancy (IUP)    Marijuana use during pregnancy    History of  premature rupture of membranes (PROM) in previous pregnancy, currently pregnant in third trimester    Hx of preeclampsia, prior pregnancy, currently pregnant    History of gestational diabetes mellitus (GDM) in prior pregnancy, currently pregnant    Carrier of spinal muscular atrophy    Pre-existing essential hypertension affecting pregnancy in third trimester    Leiomyoma in pregnancy, uterine, antepartum    Prediabetes in mother during pregnancy    Trend for excessive fetal growth affecting management of pregnancy in third trimester       No changes to medical, surgical, family, social, or obstetric history.    Medications:  Current Outpatient Medications   Medication Instructions    aspirin (ECOTRIN) 81 mg, Oral, 2 times daily, Take twice a day until 34 weeks 6 days, then decrease to once daily until delivery    insulin NPH (HUMULIN N NPH U-100 INSULIN) 100 unit/mL injection INJECT 14 UNITS AT BEDTIME    insulin syringe-needle U-100 1 mL 31 gauge x 5/16 Syrg Use 1 syringe as directed to inject insulin three times daily    metFORMIN (GLUCOPHAGE) 500 mg, Oral, With dinner    nitrofurantoin, macrocrystal-monohydrate, (MACROBID) 100 MG capsule 100 mg, 2 times daily    ONETOUCH DELICA PLUS LANCET 30 gauge Misc 1 lancet , skin prick, 4 times daily    ONETOUCH ULTRA TEST Strp Use as directed to check glucose 4 times a day    prenatal vit37-iron-folic acid 29 mg iron- 1 mg Chew Take by mouth.    progesterone (PROMETRIUM) 200 mg, Vaginal, Nightly       Review of Systems   12 point review of systems conducted, negative except as stated in the history of present illness. See HPI for details.      Objective:     Visit Vitals  /88 (BP Location: Left arm, Patient Position: Sitting)   Pulse 90   Ht 5' 8" (1.727 m)   Wt 92.5 kg (204 lb)   LMP  (LMP Unknown)   BMI 31.02 kg/m²        Physical Exam  Vitals and nursing note reviewed.   Constitutional:      "  Appearance: Normal appearance.      Comments: Increased BMI   HENT:      Head: Normocephalic and atraumatic.      Nose: Nose normal. No congestion.   Cardiovascular:      Rate and Rhythm: Normal rate.   Pulmonary:      Effort: Pulmonary effort is normal.   Skin:     Findings: No rash.   Neurological:      Mental Status: She is alert and oriented to person, place, and time.   Psychiatric:         Mood and Affect: Mood normal.         Behavior: Behavior normal.         Thought Content: Thought content normal.         Judgment: Judgment normal.         Assessment/Plan:     34 y.o.  female with IUP at 36w4d    Advanced maternal age at 34  Trend for accelerated fetal growth with an EFW of 2836 g at the 55% and the AC at the 97% on 24.  AFV is normal. BPP is 8/8 today (2024).     Reviewed risks with AMA, including risks for PTL, FGR, anomalies not seen, aneuploidy and stillbirth at term. She previously declined amniocentesis  and had negative cell free DNA.  She is aware of need for  evaluation.     Fetal surveillance as below.  Delivery timing as below.      Previous  delivery x2  She is aware of the risk of recurrence of  delivery.  She had cervical length surveillance, which ruled out cervical incompetence.  She will continue nightly vaginal progesterone until 36 weeks 6 days.  PTL precautions reviewed.      Prediabetes vs T2DM   Risks associated with diabetes in pregnancy include higher risk for polyhydramnios, fetal macrosomia and  metabolic complications (hypoglycemia, hyperbilirubinemia, hypocalcemia, erythema).     Previously discussed option of doing a fetal echocardiogram with the ambiguity about whether she had type 2 diabetes and agreed to refer for an echocardiogram.  She has appointment pending.    Continue 2200 calorie ADA diet.     Log reviewed.  Fasting blood sugars are still elevated and there are some elevated afternoon sugars.  There is only 1 reading  after supper at 152..  Patient was advised to go back checking her sugars 4 times a day over the weekend and send us her sugars on Monday. I adjusted dose of insulin to 14 units of NPH in the morning and 100 units of NPH at bedtime.  I also added metformin 500 mg with supper. Continue corrective dose of Humalog of 1 unit for every 8 mg of sugar over 140.      Patient is seeing Dr. Bruce later in the week and will see her next Tuesday and we will go back to seeing her on Friday in 2 weeks from now.    Advised the patient importance of bringing the sugar log with her as well as the glucometer machine to make sure that her numbers are matching our readings    She was advised to continue to check the glucose twice a day, fasting daily in alternate postprandial checks, and bring log to each appointment.  Urged to bring log to each appointment as log is necessary and making most optimal insulin adjustments.     Low dose aspirin as discussed.    Fetal surveillance as below.      Chronic hypertension  Chronic hypertension is associated with significant adverse pregnancy outcomes including  birth, fetal growth restriction, fetal demise, placental abruption, and  delivery. Based on findings of recent CHAP Study, it is recommended to utilize 140/90 as the threshold for initiation or titration of medical therapy for chronic hypertension in pregnancy, rather than the previously recommended threshold of 160/110. For patients on blood pressure medications at the start of pregnancy, in the absence of mitigating factors or side effects, they can be maintained on their medications, rather than discontinuing them and waiting to initiate treatment for blood pressures in the severe range.    BP Readings from Last 1 Encounters:   24 134/88   With this BP, she was advised to continue low salt diet, and hold off on antihypertensive medication at this time.     Low dose aspirin as discussed.    Recommend fetal testing  twice weekly fetal testing, to alternate with primary OB, until the end of pregnancy.  Reviewed fetal kick count instructions.    Among patients with uncomplicated chronic hypertension with no additional risk factors, delivery at 38-39 weeks appears to provide optimal balance between the risk of adverse fetal and  outcomes.However, with multiple risk factors, will reassess closer to EDC to determine optimal timeframe or GA for delivery, based on current evaluation at that time.          SMA carrier   Previously discussed with her the inheritance pattern of SMA.  She has decided not to have any additional evaluation during the pregnancy.  She is aware of the need for  evaluation.      BMI 28.4 at initial OB visit  Body mass index is 31.02 kg/m². With reasonable weight gain since last visit, she was advised to follow a healthy, diabetic diet to avoid any excessive weight gain..  Excess weight gain would be associated with worsening hypertension, worsening diabetes and adverse  outcomes, including fetal demise in utero.    Reviewed importance of FKC 3/day and prn with instructions to immediately report any decreased fetal movement.    It is important to lose weight after the pregnancy is over, especially before a future pregnancy. Breastfeeding may be an important tool in reducing the postpartum weight retention. Fetal risks were discussed with short term risk of fetal/ obesity and long term risk of adolescent component of metabolic syndrome.      At risk for preeclampsia  With her increased risk for preeclampsia, she agreed to adjust asa to 81 mg daily and continue daily until delivery. Preeclampsia precautions reviewed.       Leiomyoma in pregnancy  Risks of fibroids include  delivery,  PROM, abnormal presentations, higher risk of  sections, placenta previa, fibroid degeneration, poor fetal growth, oligohydramnios, vaginal bleeding and postpartum hemorrhage and  in rare circumstances obstruction of lower uterus/cervix, impeding vaginal delivery    We will plan to recheck fetal growth as above.    Reviewed instructions to report any increased pain, cramps, vaginal discharge or bleeding. Emphasized the importance of monitoring fetal kick count activity, and reporting immediately if decreased fetal movement occurs.      Noncompliance  She was advised of the importance of continued care and follow-up as directed with each provider. Reviewed the risks of uncontrolled  diabetes and her additional comorbidities in pregnancy and the importance of continued care for her pregnancy complications as above, as well as the risk of FDIU. She verbalized understanding.     Follow up in about 4 days (around 12/31/2024) for MFM follow-up, BPP.     No future appointments.    SARAH involvement: Patient was evaluated and examined by Dr. Wilson. ALEJANDRO Butler, helped in pre charting of part of note.    This note was created with the assistance of [employer] voice recognition software. There may be transcription errors as a result of using this technology, however minimal. Effort has been made to ensure accuracy of transcription, but any obvious errors or omissions should be clarified with the author of the document.

## 2024-12-27 ENCOUNTER — PROCEDURE VISIT (OUTPATIENT)
Dept: MATERNAL FETAL MEDICINE | Facility: CLINIC | Age: 34
End: 2024-12-27
Payer: MEDICAID

## 2024-12-27 ENCOUNTER — OFFICE VISIT (OUTPATIENT)
Dept: MATERNAL FETAL MEDICINE | Facility: CLINIC | Age: 34
End: 2024-12-27
Payer: MEDICAID

## 2024-12-27 VITALS
HEART RATE: 90 BPM | DIASTOLIC BLOOD PRESSURE: 88 MMHG | BODY MASS INDEX: 30.92 KG/M2 | SYSTOLIC BLOOD PRESSURE: 134 MMHG | HEIGHT: 68 IN | WEIGHT: 204 LBS

## 2024-12-27 DIAGNOSIS — O09.299 HISTORY OF GESTATIONAL DIABETES MELLITUS (GDM) IN PRIOR PREGNANCY, CURRENTLY PREGNANT: ICD-10-CM

## 2024-12-27 DIAGNOSIS — O34.10 LEIOMYOMA IN PREGNANCY, UTERINE, ANTEPARTUM: ICD-10-CM

## 2024-12-27 DIAGNOSIS — O09.293 HISTORY OF PRETERM PREMATURE RUPTURE OF MEMBRANES (PROM) IN PREVIOUS PREGNANCY, CURRENTLY PREGNANT IN THIRD TRIMESTER: ICD-10-CM

## 2024-12-27 DIAGNOSIS — O09.299 HX OF PREECLAMPSIA, PRIOR PREGNANCY, CURRENTLY PREGNANT: ICD-10-CM

## 2024-12-27 DIAGNOSIS — O99.810 PREDIABETES IN MOTHER DURING PREGNANCY: ICD-10-CM

## 2024-12-27 DIAGNOSIS — O10.013 PRE-EXISTING ESSENTIAL HYPERTENSION AFFECTING PREGNANCY IN THIRD TRIMESTER: Primary | ICD-10-CM

## 2024-12-27 DIAGNOSIS — O10.013 PRE-EXISTING ESSENTIAL HYPERTENSION AFFECTING PREGNANCY IN THIRD TRIMESTER: ICD-10-CM

## 2024-12-27 DIAGNOSIS — Z14.8 CARRIER OF SPINAL MUSCULAR ATROPHY: ICD-10-CM

## 2024-12-27 DIAGNOSIS — O36.63X0 EXCESSIVE FETAL GROWTH AFFECTING MANAGEMENT OF PREGNANCY IN THIRD TRIMESTER, SINGLE OR UNSPECIFIED FETUS: ICD-10-CM

## 2024-12-27 DIAGNOSIS — D25.9 LEIOMYOMA IN PREGNANCY, UTERINE, ANTEPARTUM: ICD-10-CM

## 2024-12-27 DIAGNOSIS — O09.523 MULTIGRAVIDA OF ADVANCED MATERNAL AGE IN THIRD TRIMESTER: ICD-10-CM

## 2024-12-27 DIAGNOSIS — E66.3 OVERWEIGHT (BMI 25.0-29.9): ICD-10-CM

## 2024-12-27 DIAGNOSIS — Z86.32 HISTORY OF GESTATIONAL DIABETES MELLITUS (GDM) IN PRIOR PREGNANCY, CURRENTLY PREGNANT: ICD-10-CM

## 2024-12-27 DIAGNOSIS — O24.414 INSULIN CONTROLLED GESTATIONAL DIABETES MELLITUS (GDM) IN SECOND TRIMESTER: ICD-10-CM

## 2024-12-27 LAB — GLUCOSE SERPL-MCNC: 100 MG/DL (ref 70–110)

## 2024-12-27 RX ORDER — METFORMIN HYDROCHLORIDE 500 MG/1
500 TABLET ORAL
Qty: 30 TABLET | Refills: 0 | Status: SHIPPED | OUTPATIENT
Start: 2024-12-27

## 2024-12-27 NOTE — PROGRESS NOTES
Maternal Fetal Medicine Follow Up     Subjective:     Patient ID: [id number]    Chief Complaint: [facility] follow up w/us       HPI: Cornelio Green is a 34 y.o. female  at 37w1d gestation with Estimated Date of Delivery: 25  who is here for follow-up consultation by M.    She is of advanced maternal age, 34 by the DELFINO.  She had negative cfDNA and declined amniocentesis.  She has history of 2 previous  deliveries.  Her 1st  delivery was at 35 weeks due to PPROM.  Her 2nd  delivery was at 32 weeks due to  labor. She was on Nicolaus for her last pregnancy and delivered at term.  She has elected to use vaginal progesterone this pregnancy and had cervical incompetence ruled out.  Her last pregnancy was also complicated by gestational hypertension and GDM.  She now has prediabetes with hemoglobin A1c 6.3%, fasting glucose 128 earlier this pregnancy.  She is currently on insulin, 16 units NPH in the morning 100 units of NPH at bedtime as well as metformin 500 mg with supper. She has corrective formula of 1 unit of Humalog for every 8 mg over 140.  Follow up hemoglobin A1c 6.2% on 10/29/2024.  She has CHTN, diagnosed in her last / pregnancy. She is currently on no antihypertensives. She had carrier screening that returned positive for SMA carrier.  She has declined any additional testing during the pregnancy.  Her BMI was 28.43 at initial [facility] visit. She has known fibroid uterus. She is on low dose aspirin once daily.  She has been intermittently noncompliant with her prenatal care, including visits and bringing glucose log, better recently..       Interval history since last [facility] visit: None.. She denies any leaking fluid, vaginal bleeding, contractions, decreased fetal movement. Denies headaches, visual disturbances, or epigastric pain.    Pregnancy complications include:   Patient Active Problem List   Diagnosis    Multigravida of advanced maternal age in third trimester    (BMI  "25.0-29.9)    At high risk for complications of intrauterine pregnancy (IUP)    Marijuana use during pregnancy    History of  premature rupture of membranes (PROM) in previous pregnancy, currently pregnant in third trimester    Hx of preeclampsia, prior pregnancy, currently pregnant    History of gestational diabetes mellitus (GDM) in prior pregnancy, currently pregnant    Carrier of spinal muscular atrophy    Pre-existing essential hypertension affecting pregnancy in third trimester    Leiomyoma in pregnancy, uterine, antepartum    Prediabetes in mother during pregnancy    Trend for excessive fetal growth affecting management of pregnancy in third trimester       No changes to medical, surgical, family, social, or obstetric history.    Medications:  Current Outpatient Medications   Medication Instructions    aspirin (ECOTRIN) 81 mg, Oral, 2 times daily, Take twice a day until 34 weeks 6 days, then decrease to once daily until delivery    insulin NPH (HUMULIN N NPH U-100 INSULIN) 100 unit/mL injection INJECT 14 UNITS AT BEDTIME    insulin syringe-needle U-100 1 mL 31 gauge x 5/16 Syrg Use 1 syringe as directed to inject insulin three times daily    metFORMIN (GLUCOPHAGE) 500 mg, Oral, With dinner    ONETOUCH DELICA PLUS LANCET 30 gauge Misc 1 lancet , skin prick, 4 times daily    ONETOUCH ULTRA TEST Strp Use as directed to check glucose 4 times a day    prenatal vit37-iron-folic acid 29 mg iron- 1 mg Chew Take by mouth.    progesterone (PROMETRIUM) 200 mg, Vaginal, Nightly       Review of Systems   12 point review of systems conducted, negative except as stated in the history of present illness. See HPI for details.      Objective:     Visit Vitals  BP (!) 140/82   Pulse 79   Ht 5' 8" (1.727 m)   Wt 92.1 kg (203 lb)   LMP  (LMP Unknown)   BMI 30.87 kg/m²        Physical Exam  Vitals and nursing note reviewed.   Constitutional:       Appearance: Normal appearance.   HENT:      Head: Normocephalic and " atraumatic.   Cardiovascular:      Rate and Rhythm: Normal rate and regular rhythm.   Pulmonary:      Effort: Pulmonary effort is normal. No respiratory distress.      Breath sounds: Normal breath sounds.   Abdominal:      Palpations: Abdomen is soft.      Tenderness: There is no abdominal tenderness.   Musculoskeletal:      Right lower leg: No edema.      Left lower leg: No edema.   Skin:     General: Skin is warm and dry.   Neurological:      Mental Status: She is alert and oriented to person, place, and time.   Psychiatric:         Mood and Affect: Mood normal.         Behavior: Behavior normal.         Thought Content: Thought content normal.         Judgment: Judgment normal.         Assessment/Plan:     34 y.o.  female with IUP at 37w1d    Advanced maternal age at 34  Trend for accelerated fetal growth with an EFW of 2836 g at the 55% and the AC at the 97% on 24.  AFV is normal. BPP is 8/8 today (2024).     Reviewed risks with AMA, including risks for PTL, FGR, anomalies not seen, aneuploidy and stillbirth at term. She previously declined amniocentesis  and had negative cell free DNA.  She is aware of need for  evaluation.     Fetal surveillance as below.  Delivery timing as below.      Previous  delivery x2  She completed vaginal progesterone.  Labor precautions reviewed.      Prediabetes vs T2DM   Risks associated with diabetes in pregnancy include higher risk for polyhydramnios, fetal macrosomia and  metabolic complications (hypoglycemia, hyperbilirubinemia, hypocalcemia, erythema).     Previously discussed option of doing a fetal echocardiogram with the ambiguity about whether she had type 2 diabetes and agreed to refer for an echocardiogram.  She has appointment pending.    Continue 2200 calorie ADA diet.     Log reviewed.  She was advised to adjust to 6 units of NPH in the morning and continue 100 units of NPH at bedtime.  Adjusted metformin to 750 mg with  supper. Continue corrective dose of Humalog of 1 unit for every 8 mg of sugar over 140.    She was advised to continue to check her glucose 4 times a day and bring log to each appointment.     Low dose aspirin as discussed.    Fetal surveillance as below.      Chronic hypertension  Chronic hypertension is associated with significant adverse pregnancy outcomes including  birth, fetal growth restriction, fetal demise, placental abruption, and  delivery. Based on findings of recent CHAP Study, it is recommended to utilize 140/90 as the threshold for initiation or titration of medical therapy for chronic hypertension in pregnancy, rather than the previously recommended threshold of 160/110. For patients on blood pressure medications at the start of pregnancy, in the absence of mitigating factors or side effects, they can be maintained on their medications, rather than discontinuing them and waiting to initiate treatment for blood pressures in the severe range.    Vitals:    24 1022 24 1032 24 1058   BP: (!) 140/90 (!) 138/90 (!) 140/82       With this BP, and patient asymptomatic, discussed with Dr. Wilson and she was advised she was advised to start labetalol 100 mg every 12 hours.  Preeclampsia labs ordered and advised to go downstairs to do those now.    Low dose aspirin as discussed.    Recommend fetal testing twice weekly fetal testing, to alternate with primary OB, until the end of pregnancy.  Reviewed fetal kick count instructions.    Discussed with Dr. Wilson.  With chronic hypertension, AMA, elevated BMI, and diabetes with suboptimally controlled glucose, recommend delivery at  38 weeks (38 0/7-6/7 weeks) gestation as this timing seems to provide optimal balance of risks of prematurity versus risk of continued pregnancy.  Earlier delivery may be needed for any worsening maternal or fetal condition.  Labor precautions reviewed.      SMA carrier   Previously discussed with her the  inheritance pattern of SMA.  She has decided not to have any additional evaluation during the pregnancy.  She is aware of the need for  evaluation.      BMI 28.4 at initial OB visit  Body mass index is 30.87 kg/m². With relatively stable weight recently, she was advised to continue healthy, low-sodium, diabetic diet avoiding any additional excessive weight gain.  Excess weight gain would be associated with worsening hypertension, worsening diabetes and adverse  outcomes, including fetal demise in utero.    Reviewed importance of FKC 3/day and prn with instructions to immediately report any decreased fetal movement.    It is important to lose weight after the pregnancy is over, especially before a future pregnancy. Breastfeeding may be an important tool in reducing the postpartum weight retention. Fetal risks were discussed with short term risk of fetal/ obesity and long term risk of adolescent component of metabolic syndrome.      At risk for preeclampsia  With her increased risk for preeclampsia, she was advised to continue low-dose aspirin once daily until delivery.  Preeclampsia precautions reviewed.       Leiomyoma in pregnancy  Risks of fibroids include  delivery,  PROM, abnormal presentations, higher risk of  sections, placenta previa, fibroid degeneration, poor fetal growth, oligohydramnios, vaginal bleeding and postpartum hemorrhage and in rare circumstances obstruction of lower uterus/cervix, impeding vaginal delivery    We will plan to recheck fetal growth as above.    Reviewed instructions to report any increased pain, cramps, vaginal discharge or bleeding. Emphasized the importance of monitoring fetal kick count activity, and reporting immediately if decreased fetal movement occurs.      Noncompliance  Reviewed the importance of continued care and follow-up as directed with each provider. Reviewed the risks of uncontrolled  diabetes and her additional  comorbidities in pregnancy and the importance of continued care for her pregnancy complications as above, as well as the risk of FDIU. She verbalized understanding.     Follow up for Keep return appointment unless delivered.     Future Appointments   Date Time Provider Department Center   1/10/2025 11:15 AM ROOM 2, [facility]   1/10/2025 11:30 AM Bradford Wilson MD [facility]      Addendum 12/31/2024 at 12:35 p.m:  Reviewed labs.  No laboratory evidence of preeclampsia at this time.  Advised patient.  Reviewed preeclampsia precautions. Answered all questions and patient verbalized understanding.      This note was created with the assistance of [employer] voice recognition software. There may be transcription errors as a result of using this technology, however minimal. Effort has been made to ensure accuracy of transcription, but any obvious errors or omissions should be clarified with the author of the document.

## 2024-12-31 ENCOUNTER — OFFICE VISIT (OUTPATIENT)
Dept: MATERNAL FETAL MEDICINE | Facility: CLINIC | Age: 34
End: 2024-12-31
Payer: MEDICAID

## 2024-12-31 ENCOUNTER — PROCEDURE VISIT (OUTPATIENT)
Dept: MATERNAL FETAL MEDICINE | Facility: CLINIC | Age: 34
End: 2024-12-31
Payer: MEDICAID

## 2024-12-31 VITALS
HEART RATE: 79 BPM | BODY MASS INDEX: 30.77 KG/M2 | WEIGHT: 203 LBS | SYSTOLIC BLOOD PRESSURE: 140 MMHG | DIASTOLIC BLOOD PRESSURE: 82 MMHG | HEIGHT: 68 IN

## 2024-12-31 DIAGNOSIS — O10.013 PRE-EXISTING ESSENTIAL HYPERTENSION AFFECTING PREGNANCY IN THIRD TRIMESTER: ICD-10-CM

## 2024-12-31 DIAGNOSIS — O09.299 HX OF PREECLAMPSIA, PRIOR PREGNANCY, CURRENTLY PREGNANT: ICD-10-CM

## 2024-12-31 DIAGNOSIS — O09.293 HISTORY OF PRETERM PREMATURE RUPTURE OF MEMBRANES (PROM) IN PREVIOUS PREGNANCY, CURRENTLY PREGNANT IN THIRD TRIMESTER: ICD-10-CM

## 2024-12-31 DIAGNOSIS — O09.299 HISTORY OF GESTATIONAL DIABETES MELLITUS (GDM) IN PRIOR PREGNANCY, CURRENTLY PREGNANT: ICD-10-CM

## 2024-12-31 DIAGNOSIS — D25.9 LEIOMYOMA IN PREGNANCY, UTERINE, ANTEPARTUM: ICD-10-CM

## 2024-12-31 DIAGNOSIS — O99.320 MARIJUANA USE DURING PREGNANCY: Primary | ICD-10-CM

## 2024-12-31 DIAGNOSIS — F12.90 MARIJUANA USE DURING PREGNANCY: Primary | ICD-10-CM

## 2024-12-31 DIAGNOSIS — O09.523 MULTIGRAVIDA OF ADVANCED MATERNAL AGE IN THIRD TRIMESTER: ICD-10-CM

## 2024-12-31 DIAGNOSIS — O99.810 PREDIABETES IN MOTHER DURING PREGNANCY: ICD-10-CM

## 2024-12-31 DIAGNOSIS — Z86.32 HISTORY OF GESTATIONAL DIABETES MELLITUS (GDM) IN PRIOR PREGNANCY, CURRENTLY PREGNANT: ICD-10-CM

## 2024-12-31 DIAGNOSIS — E66.3 OVERWEIGHT (BMI 25.0-29.9): ICD-10-CM

## 2024-12-31 DIAGNOSIS — O34.10 LEIOMYOMA IN PREGNANCY, UTERINE, ANTEPARTUM: ICD-10-CM

## 2024-12-31 LAB — GLUCOSE SERPL-MCNC: 94 MG/DL (ref 70–110)

## 2024-12-31 PROCEDURE — 76819 FETAL BIOPHYS PROFIL W/O NST: CPT | Mod: S$GLB,,, | Performed by: OBSTETRICS & GYNECOLOGY

## 2025-01-07 DIAGNOSIS — Z86.32 HISTORY OF GESTATIONAL DIABETES MELLITUS (GDM) IN PRIOR PREGNANCY, CURRENTLY PREGNANT: ICD-10-CM

## 2025-01-07 DIAGNOSIS — O99.320 MARIJUANA USE DURING PREGNANCY: Primary | ICD-10-CM

## 2025-01-07 DIAGNOSIS — O09.299 HISTORY OF GESTATIONAL DIABETES MELLITUS (GDM) IN PRIOR PREGNANCY, CURRENTLY PREGNANT: ICD-10-CM

## 2025-01-07 DIAGNOSIS — F12.90 MARIJUANA USE DURING PREGNANCY: Primary | ICD-10-CM

## 2025-01-07 DIAGNOSIS — O10.013 PRE-EXISTING ESSENTIAL HYPERTENSION AFFECTING PREGNANCY IN THIRD TRIMESTER: ICD-10-CM

## 2025-01-07 DIAGNOSIS — O99.810 PREDIABETES IN MOTHER DURING PREGNANCY: ICD-10-CM

## 2025-06-30 PROBLEM — Z86.32 HISTORY OF GESTATIONAL DIABETES MELLITUS (GDM) IN PRIOR PREGNANCY, CURRENTLY PREGNANT: Status: RESOLVED | Noted: 2024-07-22 | Resolved: 2025-06-30

## 2025-06-30 PROBLEM — O09.299 HX OF PREECLAMPSIA, PRIOR PREGNANCY, CURRENTLY PREGNANT: Status: RESOLVED | Noted: 2024-07-22 | Resolved: 2025-06-30

## 2025-06-30 PROBLEM — O09.299 HISTORY OF GESTATIONAL DIABETES MELLITUS (GDM) IN PRIOR PREGNANCY, CURRENTLY PREGNANT: Status: RESOLVED | Noted: 2024-07-22 | Resolved: 2025-06-30

## 2025-08-04 PROBLEM — O09.523 MULTIGRAVIDA OF ADVANCED MATERNAL AGE IN THIRD TRIMESTER: Status: RESOLVED | Noted: 2024-07-22 | Resolved: 2025-08-04

## 2025-08-14 ENCOUNTER — TELEPHONE (OUTPATIENT)
Dept: MATERNAL FETAL MEDICINE | Facility: CLINIC | Age: 35
End: 2025-08-14
Payer: MEDICAID

## 2025-08-14 DIAGNOSIS — O24.119 TYPE 2 DIABETES MELLITUS DURING PREGNANCY, ANTEPARTUM: Primary | ICD-10-CM

## 2025-08-15 DIAGNOSIS — O10.013 PRE-EXISTING ESSENTIAL HYPERTENSION AFFECTING PREGNANCY IN THIRD TRIMESTER: Primary | ICD-10-CM

## 2025-08-15 DIAGNOSIS — O09.522 SUPERVISION OF ELDERLY MULTIGRAVIDA IN SECOND TRIMESTER: ICD-10-CM

## 2025-08-18 PROBLEM — O36.63X0 EXCESSIVE FETAL GROWTH AFFECTING MANAGEMENT OF PREGNANCY IN THIRD TRIMESTER: Status: RESOLVED | Noted: 2024-12-13 | Resolved: 2025-08-18

## 2025-08-20 PROBLEM — O09.299 PRIOR PREGNANCY COMPLICATED BY IUGR, ANTEPARTUM: Status: ACTIVE | Noted: 2025-08-20

## 2025-08-20 PROBLEM — O09.40 GRAND MULTIPARITY WITH CURRENT PREGNANCY, ANTEPARTUM: Status: ACTIVE | Noted: 2025-08-20

## 2025-08-20 PROBLEM — O35.2XX0 MATERNAL CARE FOR SUSPECTED HEREDITARY DISEASE IN FETUS: Status: ACTIVE | Noted: 2025-08-20

## 2025-08-20 PROBLEM — O09.899 SHORT INTERVAL BETWEEN PREGNANCIES AFFECTING PREGNANCY, ANTEPARTUM: Status: ACTIVE | Noted: 2025-08-20

## 2025-08-20 PROBLEM — O09.529 ANTEPARTUM MULTIGRAVIDA OF ADVANCED MATERNAL AGE: Status: ACTIVE | Noted: 2024-07-22

## 2025-08-20 PROBLEM — O10.019: Status: ACTIVE | Noted: 2024-07-23

## 2025-08-20 PROBLEM — O09.299 HISTORY OF PRETERM PREMATURE RUPTURE OF MEMBRANES (PROM) IN PREVIOUS PREGNANCY, CURRENTLY PREGNANT: Status: ACTIVE | Noted: 2024-07-22

## 2025-08-21 ENCOUNTER — TELEPHONE (OUTPATIENT)
Dept: MATERNAL FETAL MEDICINE | Facility: CLINIC | Age: 35
End: 2025-08-21
Payer: MEDICAID

## 2025-08-22 ENCOUNTER — TELEPHONE (OUTPATIENT)
Dept: MATERNAL FETAL MEDICINE | Facility: CLINIC | Age: 35
End: 2025-08-22

## 2025-09-04 ENCOUNTER — TELEPHONE (OUTPATIENT)
Dept: MATERNAL FETAL MEDICINE | Facility: CLINIC | Age: 35
End: 2025-09-04
Payer: MEDICAID

## 2025-09-05 ENCOUNTER — TELEPHONE (OUTPATIENT)
Dept: MATERNAL FETAL MEDICINE | Facility: CLINIC | Age: 35
End: 2025-09-05